# Patient Record
Sex: MALE | Race: WHITE | Employment: FULL TIME | URBAN - METROPOLITAN AREA
[De-identification: names, ages, dates, MRNs, and addresses within clinical notes are randomized per-mention and may not be internally consistent; named-entity substitution may affect disease eponyms.]

---

## 2017-03-22 RX ORDER — METFORMIN HYDROCHLORIDE 1000 MG/1
TABLET ORAL
Qty: 180 TAB | Refills: 1 | Status: SHIPPED | OUTPATIENT
Start: 2017-03-22 | End: 2017-10-28 | Stop reason: SDUPTHER

## 2017-08-31 ENCOUNTER — OFFICE VISIT (OUTPATIENT)
Dept: INTERNAL MEDICINE CLINIC | Age: 52
End: 2017-08-31

## 2017-08-31 VITALS
HEIGHT: 71 IN | OXYGEN SATURATION: 97 % | BODY MASS INDEX: 31.92 KG/M2 | WEIGHT: 228 LBS | RESPIRATION RATE: 18 BRPM | HEART RATE: 51 BPM | TEMPERATURE: 97.9 F | SYSTOLIC BLOOD PRESSURE: 111 MMHG | DIASTOLIC BLOOD PRESSURE: 73 MMHG

## 2017-08-31 DIAGNOSIS — I48.91 ATRIAL FIBRILLATION, UNSPECIFIED TYPE (HCC): Primary | ICD-10-CM

## 2017-08-31 DIAGNOSIS — E11.9 TYPE 2 DIABETES MELLITUS WITHOUT COMPLICATION, WITHOUT LONG-TERM CURRENT USE OF INSULIN (HCC): ICD-10-CM

## 2017-08-31 DIAGNOSIS — M10.9 GOUT, UNSPECIFIED CAUSE, UNSPECIFIED CHRONICITY, UNSPECIFIED SITE: ICD-10-CM

## 2017-08-31 DIAGNOSIS — I10 ESSENTIAL HYPERTENSION: ICD-10-CM

## 2017-08-31 DIAGNOSIS — E78.5 HYPERLIPIDEMIA, UNSPECIFIED HYPERLIPIDEMIA TYPE: ICD-10-CM

## 2017-08-31 RX ORDER — RIVAROXABAN 20 MG/1
TABLET, FILM COATED ORAL
COMMUNITY
Start: 2017-08-24 | End: 2018-10-31 | Stop reason: ALTCHOICE

## 2017-08-31 RX ORDER — METHYLPREDNISOLONE 4 MG/1
TABLET ORAL
COMMUNITY
Start: 2017-08-17 | End: 2017-08-31 | Stop reason: ALTCHOICE

## 2017-08-31 RX ORDER — COLCHICINE 0.6 MG/1
0.6 TABLET ORAL 2 TIMES DAILY
COMMUNITY
End: 2018-02-23 | Stop reason: SDUPTHER

## 2017-08-31 RX ORDER — AMOXICILLIN AND CLAVULANATE POTASSIUM 875; 125 MG/1; MG/1
TABLET, FILM COATED ORAL EVERY 12 HOURS
COMMUNITY
End: 2018-10-31 | Stop reason: ALTCHOICE

## 2017-08-31 RX ORDER — HYDROCHLOROTHIAZIDE 50 MG/1
TABLET ORAL
COMMUNITY
Start: 2017-08-24 | End: 2017-08-31 | Stop reason: ALTCHOICE

## 2017-08-31 RX ORDER — METOPROLOL SUCCINATE 100 MG/1
TABLET, EXTENDED RELEASE ORAL
COMMUNITY
Start: 2017-08-24 | End: 2017-10-26 | Stop reason: SDUPTHER

## 2017-08-31 RX ORDER — DOFETILIDE 0.5 MG/1
CAPSULE ORAL
COMMUNITY
Start: 2017-08-24 | End: 2018-04-26 | Stop reason: ALTCHOICE

## 2017-08-31 NOTE — PROGRESS NOTES
HISTORY OF PRESENT ILLNESS  Sadi Rae is a 54 y.o. male. HPI  Presents for f/u hospital admit    Admitted and dx with Afib following chest pressure at work    Failed to convert s/p meds  Then, s/p cardioversion after MARCO with no clot  On tikosyn    Cards appt next Friday 9/8/17    Had apparent gouty flare  Rx'd medrol dose pack  Stopped when developed Afib  Colchicine Rx'd but not taken yet    rx'd augmentin - unclear reason why    Past medical, Social, and Family history reviewed  Medications reviewed and updated. ROS  Complete ROS reviewed and negative or stable except as noted in HPI. Physical Exam   Constitutional: He is oriented to person, place, and time. He appears well-nourished. No distress. HENT:   Head: Normocephalic and atraumatic. Mouth/Throat: Oropharynx is clear and moist. No oropharyngeal exudate. Eyes: EOM are normal. Pupils are equal, round, and reactive to light. No scleral icterus. Neck: Normal range of motion. Neck supple. No JVD present. No thyromegaly present. Cardiovascular: Normal rate, regular rhythm and normal heart sounds. Exam reveals no gallop and no friction rub. No murmur heard. Pulmonary/Chest: Effort normal and breath sounds normal. No respiratory distress. He has no wheezes. He has no rales. Abdominal: Soft. Bowel sounds are normal. He exhibits no distension. There is no tenderness. Musculoskeletal: Normal range of motion. He exhibits no edema. Lymphadenopathy:     He has no cervical adenopathy. Neurological: He is alert and oriented to person, place, and time. He exhibits normal muscle tone. Coordination normal.   Skin: Skin is warm. No rash noted. Psychiatric: He has a normal mood and affect. Nursing note and vitals reviewed. Prior labs reviewed. Reports uric acid of 8.1    ASSESSMENT and PLAN    ICD-10-CM ICD-9-CM    1. Atrial fibrillation, unspecified type ([facility] I48.91 427.31    2.  Gout, unspecified cause, unspecified chronicity, unspecified site M10.9 274.9    3. Hyperlipidemia, unspecified hyperlipidemia type E78.5 272.4    4. Type 2 diabetes mellitus without complication, without long-term current use of insulin (HCC) E11.9 250.00    5.  Essential hypertension I10 401.9      Follow-up Disposition:  Return in about 2 months (around 10/31/2017), or if symptoms worsen or fail to improve, for cholesterol, diabetes, Afib.  results and schedule of future studies reviewed with patient  reviewed diet, exercise and weight    cardiovascular risk and specific lipid/LDL goals reviewed  reviewed medications and side effects in detail   Get records  Hold on allopurinol or Uloric for now - reviewed each  Dc HCTZ as potential contributor to gout and good BP control today  CT for foot ordered per podiatry  See cards  Continue heart meds and Xarelto

## 2017-08-31 NOTE — LETTER
NOTIFICATION RETURN TO WORK / SCHOOL 
 
8/31/2017 Mr. Berenice Winters 
[address] [place] [address] To Whom It May Concern: 
 
Berenice Winters is currently under the care of Jose Enrique. He will return to work/school on: 9/1/17 If there are questions or concerns please have the patient contact our office. Sincerely, Shadia Strange MD

## 2017-08-31 NOTE — MR AVS SNAPSHOT
Visit Information Date & Time Provider Department Dept. Phone Encounter #  
 8/31/2017 12:00 PM Anahi Vines, [address] [place] and Internal Medicine [phone] [id number] Follow-up Instructions Return in about 2 months (around 10/31/2017), or if symptoms worsen or fail to improve, for cholesterol, diabetes, Afib. Your Appointments 9/13/2017  1:30 PM  
ROUTINE CARE with Anahi Vines MD  
[facility] and Internal Medicine [facility] Appt Note: medication f/u per pt  
 [address] [facility] [address]  
[address] [facility] [address] Upcoming Health Maintenance Date Due MICROALBUMIN Q1 12/31/2015 EYE EXAM RETINAL OR DILATED Q1 5/5/2016 FOOT EXAM Q1 12/30/2016 HEMOGLOBIN A1C Q6M 4/17/2017 INFLUENZA AGE 9 TO ADULT 8/1/2017 LIPID PANEL Q1 10/17/2017 COLONOSCOPY 1/1/2023 DTaP/Tdap/Td series (2 - Td) 12/12/2024 Allergies as of 8/31/2017  Review Complete On: 8/31/2017 By: Anahi Vines MD  
  
 Severity Noted Reaction Type Reactions Fiorinal [Butalbital-aspirin-caffeine]  05/06/2013    Unknown (comments) Throat swelling Current Immunizations  Reviewed on 10/17/2016 Name Date Tdap 12/12/2014 Not reviewed this visit You Were Diagnosed With   
  
 Codes Comments Atrial fibrillation, unspecified type ([facility]    -  Primary ICD-10-CM: I48.91 
ICD-9-CM: 427.31 Gout, unspecified cause, unspecified chronicity, unspecified site     ICD-10-CM: M10.9 ICD-9-CM: 274.9 Hyperlipidemia, unspecified hyperlipidemia type     ICD-10-CM: E78.5 ICD-9-CM: 272.4 Type 2 diabetes mellitus without complication, without long-term current use of insulin (HCC)     ICD-10-CM: E11.9 ICD-9-CM: 250.00 Essential hypertension     ICD-10-CM: I10 
ICD-9-CM: 401.9 Vitals BP Pulse Temp Resp Height(growth percentile) Weight(growth percentile) 111/73 (BP 1 Location: Left arm, BP Patient Position: Sitting) (!) 51 97.9 °F (36.6 °C) (Oral) 18 5' 11\" (1.803 m) 228 lb (103.4 kg) SpO2 BMI Smoking Status 97% 31.8 kg/m2 Former Smoker BMI and BSA Data Body Mass Index Body Surface Area  
 31.8 kg/m 2 2.28 m 2 Preferred Pharmacy Pharmacy Name Phone [facility]/PHARMACY #[id number] [facility] [phone] Your Updated Medication List  
  
   
This list is accurate as of: 8/31/17  1:45 PM.  Always use your most recent med list.  
  
  
  
  
 AUGMENTIN 875-125 mg per tablet Generic drug:  amoxicillin-clavulanate Take  by mouth every twelve (12) hours. colchicine 0.6 mg tablet Take 0.6 mg by mouth two (2) times a day. dofetilide 500 mcg capsule Commonly known as:  TIKOSYN  
  
 lisinopril 40 mg tablet Commonly known as:  PRINIVIL, ZESTRIL  
TAKE 1 TABLET BY MOUTH DAILY  
  
 metFORMIN 1,000 mg tablet Commonly known as:  GLUCOPHAGE  
TAKE 1 TABLET BY MOUTH 2 TIMES DAILY WITH MEALS  
  
 metoprolol succinate 100 mg tablet Commonly known as:  TOPROL-XL  
  
 pravastatin 80 mg tablet Commonly known as:  PRAVACHOL  
TAKE 1 TABLET BY MOUTH NIGHTLY  
  
 triamcinolone acetonide 0.1 % topical cream  
Commonly known as:  KENALOG Apply  to affected area two (2) times a day. use thin layer XARELTO 20 mg Tab tablet Generic drug:  rivaroxaban Follow-up Instructions Return in about 2 months (around 10/31/2017), or if symptoms worsen or fail to improve, for cholesterol, diabetes, Afib. To-Do List   
 09/01/2017 1:00 PM  
  Appointment with [facility] 1 at [facility] CT ([phone]) NON-CONTRAST STUDY: 1. Bring any non [facility] facility films/images pertaining to the area of interest with you on the day of appointment.  2. Check in at registration at least 30 minutes before appt time unless you were instructed to do otherwise. 3. If you have to drink oral contrast please pick it up any weekday prior to your appointment, if you cannot please check in 2 hrs  before appt time. Patient Instructions Uloric vs Allopurinol for gout Introducing [facility] SERVICES! Willadean Saint introduces [employer] patient portal. Now you can access parts of your medical record, email your doctor's office, and request medication refills online. 1. In your internet browser, go to [email or web address] 2. Click on the First Time User? Click Here link in the Sign In box. You will see the New Member Sign Up page. 3. Enter your [employer] Access Code exactly as it appears below. You will not need to use this code after youve completed the sign-up process. If you do not sign up before the expiration date, you must request a new code. · [employer] Access Code: [email or web address] Expires: 11/16/2017  1:26 PM 
 
4. Enter the last four digits of your Social Security Number (xxxx) and Date of Birth (mm/dd/yyyy) as indicated and click Submit. You will be taken to the next sign-up page. 5. Create a [employer]ie[employer] ID. This will be your [employer] login ID and cannot be changed, so think of one that is secure and easy to remember. 6. Create a [employer] password. You can change your password at any time. 7. Enter your Password Reset Question and Answer. This can be used at a later time if you forget your password. 8. Enter your e-mail address. You will receive e-mail notification when new information is available in [address] 9. Click Sign Up. You can now view and download portions of your medical record. 10. Click the Download Summary menu link to download a portable copy of your medical information. If you have questions, please visit the Frequently Asked Questions section of the [employer] website.  Remember, [employer] is NOT to be used for urgent needs. For medical emergencies, dial 911. Now available from your iPhone and Android! Please provide this summary of care documentation to your next provider. Your primary care clinician is listed as [id number] If you have any questions after today's visit, please call [phone].

## 2017-08-31 NOTE — PROGRESS NOTES
Rm 14    Chief Complaint   Patient presents with   [facility] Follow Up     IRENE NOWAK from [facility]     Patient was Admitted 8/21/2017 to [facility]   with Afib with RVR   ,  Patient was in for 4 days d/c 8/24      Patient states he went to a podiatry appt prior ro felix to the Er Podiatrist put him on Methylprednisolone 4 mg , Pt is wondering if this could have caused his a fib  Health Maintenance Due   Topic Date Due    MICROALBUMIN Q1  12/31/2015    EYE EXAM RETINAL OR DILATED Q1  05/05/2016    FOOT EXAM Q1  12/30/2016    HEMOGLOBIN A1C Q6M  04/17/2017    INFLUENZA AGE 9 TO ADULT  08/01/2017           1. Have you been to the ER, urgent care clinic since your last visit? Hospitalized since your last visit? yes/ [facility] ER/ 8/21- 8/24/ Afin with RVR    2. Have you seen or consulted any other health care providers outside of the [address] [place] [address] since your last visit? Include any pap smears or colon screening.  Yes see above medical release form completed to be sent for record    Learning Assessment 6/12/2015   PRIMARY LEARNER Patient   BARRIERS PRIMARY LEARNER NONE   PRIMARY LANGUAGE ENGLISH   LEARNER PREFERENCE PRIMARY DEMONSTRATION   ANSWERED BY [employer]   RELATIONSHIP SELF     PHQ over the last two weeks 12/30/2015   Little interest or pleasure in doing things Not at all   Feeling down, depressed or hopeless Not at all   Total Score PHQ 2 0     Living medical will information given at previous visit

## 2017-09-01 ENCOUNTER — HOSPITAL ENCOUNTER (OUTPATIENT)
Dept: CT IMAGING | Age: 52
Discharge: HOME | End: 2017-09-01
Attending: PODIATRIST
Payer: COMMERCIAL

## 2017-09-01 DIAGNOSIS — M79.671 RIGHT FOOT PAIN: ICD-10-CM

## 2017-09-01 PROCEDURE — 73700 CT LOWER EXTREMITY W/O DYE: CPT

## 2017-09-05 RX ORDER — PRAVASTATIN SODIUM 80 MG/1
TABLET ORAL
Qty: 90 TAB | Refills: 1 | Status: SHIPPED | OUTPATIENT
Start: 2017-09-05 | End: 2017-11-30 | Stop reason: ALTCHOICE

## 2017-10-07 RX ORDER — LISINOPRIL 40 MG/1
TABLET ORAL
Qty: 90 TAB | Refills: 1 | Status: SHIPPED | OUTPATIENT
Start: 2017-10-07 | End: 2018-04-05 | Stop reason: SDUPTHER

## 2017-10-22 RX ORDER — HYDROCHLOROTHIAZIDE 25 MG/1
TABLET ORAL
Qty: 90 TAB | Refills: 0 | OUTPATIENT
Start: 2017-10-22

## 2017-10-26 ENCOUNTER — OFFICE VISIT (OUTPATIENT)
Dept: INTERNAL MEDICINE CLINIC | Age: 52
End: 2017-10-26

## 2017-10-26 VITALS
DIASTOLIC BLOOD PRESSURE: 93 MMHG | BODY MASS INDEX: 34.61 KG/M2 | RESPIRATION RATE: 16 BRPM | SYSTOLIC BLOOD PRESSURE: 176 MMHG | HEART RATE: 47 BPM | OXYGEN SATURATION: 97 % | HEIGHT: 71 IN | TEMPERATURE: 97.9 F | WEIGHT: 247.2 LBS

## 2017-10-26 DIAGNOSIS — M10.9 GOUT, UNSPECIFIED CAUSE, UNSPECIFIED CHRONICITY, UNSPECIFIED SITE: ICD-10-CM

## 2017-10-26 DIAGNOSIS — I10 ESSENTIAL HYPERTENSION: Primary | ICD-10-CM

## 2017-10-26 DIAGNOSIS — E11.9 TYPE 2 DIABETES MELLITUS WITHOUT COMPLICATION, WITHOUT LONG-TERM CURRENT USE OF INSULIN (HCC): ICD-10-CM

## 2017-10-26 DIAGNOSIS — E78.5 HYPERLIPIDEMIA, UNSPECIFIED HYPERLIPIDEMIA TYPE: ICD-10-CM

## 2017-10-26 DIAGNOSIS — R63.5 WEIGHT GAIN: ICD-10-CM

## 2017-10-26 DIAGNOSIS — N50.811 PAIN IN RIGHT TESTICLE: ICD-10-CM

## 2017-10-26 LAB
ALBUMIN UR QL STRIP: 30 MG/L
CREATININE, URINE POC: 50 MG/DL
HBA1C MFR BLD HPLC: 5.3 % (ref 4.8–5.6)
MICROALBUMIN/CREAT RATIO POC: NORMAL MG/G

## 2017-10-26 RX ORDER — METOPROLOL SUCCINATE 50 MG/1
50 TABLET, EXTENDED RELEASE ORAL DAILY
Qty: 30 TAB | Refills: 0
Start: 2017-10-26 | End: 2018-04-26 | Stop reason: SDUPTHER

## 2017-10-26 RX ORDER — AMLODIPINE BESYLATE 5 MG/1
5 TABLET ORAL DAILY
Qty: 30 TAB | Refills: 5 | Status: SHIPPED | OUTPATIENT
Start: 2017-10-26 | End: 2017-11-29 | Stop reason: SDUPTHER

## 2017-10-26 NOTE — PROGRESS NOTES
Rm 15    Chief Complaint   Patient presents with    Follow-up     cholesterol, diabetes, Afib     1. Have you been to the ER, urgent care clinic since your last visit? Hospitalized since your last visit? No    2. Have you seen or consulted any other health care providers outside of the [address] [place] [address] since your last visit? Include any pap smears or colon screening.  No    Health Maintenance Due   Topic Date Due    MICROALBUMIN Q1  12/31/2015    EYE EXAM RETINAL OR DILATED Q1  05/05/2016    FOOT EXAM Q1  12/30/2016    HEMOGLOBIN A1C Q6M  04/17/2017    INFLUENZA AGE 9 TO ADULT  08/01/2017    LIPID PANEL Q1  10/17/2017    No recent eye exam per pt  Pt declines the flu shot today    PHQ over the last two weeks 10/26/2017   Little interest or pleasure in doing things Not at all   Feeling down, depressed or hopeless Not at all   Total Score PHQ 2 0

## 2017-10-26 NOTE — PROGRESS NOTES
HISTORY OF PRESENT ILLNESS  Chelo Campuzano is a 54 y.o. male. HPI  Presents for f/u HTN, lipids, afib    Pt has seen Cards and EP  Planning for ablation    +wt gain  No edema or orthopnea    Pt had argument with employer right before coming over to the visit    [facility] and struck testicle and quite painful    Past medical, Social, and Family history reviewed  Medications reviewed and updated. ROS  Complete ROS reviewed and negative or stable except as noted in HPI. Physical Exam   Constitutional: He is oriented to person, place, and time. He appears well-nourished. No distress. HENT:   Head: Normocephalic and atraumatic. Mouth/Throat: Oropharynx is clear and moist. No oropharyngeal exudate. Eyes: EOM are normal. Pupils are equal, round, and reactive to light. No scleral icterus. Neck: Normal range of motion. Neck supple. No JVD present. No thyromegaly present. Cardiovascular: Normal rate, regular rhythm and normal heart sounds. Exam reveals no gallop and no friction rub. No murmur heard. Pulmonary/Chest: Effort normal and breath sounds normal. No respiratory distress. He has no wheezes. He has no rales. Abdominal: Soft. Bowel sounds are normal. He exhibits no distension. There is no tenderness. Genitourinary:   Genitourinary Comments: Right testicle mildly tender, NL size, no nodule or mass, seated high in the scrotum though. Musculoskeletal: Normal range of motion. He exhibits no edema. Lymphadenopathy:     He has no cervical adenopathy. Neurological: He is alert and oriented to person, place, and time. He exhibits normal muscle tone. Coordination normal.   Skin: Skin is warm. No rash noted. Psychiatric: He has a normal mood and affect. Nursing note and vitals reviewed. Prior labs reviewed. ASSESSMENT and PLAN  ? reactive HTN today     ICD-10-CM ICD-9-CM    1.  Essential hypertension I10 401.9 CBC WITH AUTOMATED DIFF      amLODIPine (NORVASC) 5 mg tablet 2. Type 2 diabetes mellitus without complication, without long-term current use of insulin (HCC) E11.9 250.00 AMB POC HEMOGLOBIN A1C      AMB POC URINE, MICROALBUMIN, SEMIQUANT (3 RESULTS)   3. Hyperlipidemia, unspecified hyperlipidemia type U31.9 819.4 METABOLIC PANEL, COMPREHENSIVE      LIPID PANEL   4. Pain in right testicle N50.811 608.9 US SCROTUM/TESTICLES   5. Weight gain R63.5 783.1 T4, FREE      TSH 3RD GENERATION   6. Gout, unspecified cause, unspecified chronicity, unspecified site M10.9 274.9 URIC ACID     Follow-up Disposition:  Return in about 1 month (around 11/26/2017), or if symptoms worsen or fail to improve, for blood pressure.    results and schedule of future studies reviewed with patient  reviewed diet, exercise and weight    cardiovascular risk and specific lipid/LDL goals reviewed  reviewed medications and side effects in detail   Check labs to include thyroid, lipids - return fasting  Consider norvasc if BP remains elevated - Rx eRx'd  Ablation 11/6/17 as scheduled  Testicle, scrotal US

## 2017-10-26 NOTE — MR AVS SNAPSHOT
Visit Information Date & Time Provider Department Dept. Phone Encounter #  
 10/26/2017 11:45 AM Mateusz Burton, [address] [place] [address] and Internal Medicine [phone] [id number] Follow-up Instructions Return in about 1 month (around 11/26/2017), or if symptoms worsen or fail to improve, for blood pressure. Upcoming Health Maintenance Date Due MICROALBUMIN Q1 12/31/2015 EYE EXAM RETINAL OR DILATED Q1 5/5/2016 FOOT EXAM Q1 12/30/2016 HEMOGLOBIN A1C Q6M 4/17/2017 LIPID PANEL Q1 10/17/2017 COLONOSCOPY 1/1/2023 DTaP/Tdap/Td series (2 - Td) 12/12/2024 Allergies as of 10/26/2017  Review Complete On: 10/26/2017 By: Mateusz Burton MD  
  
 Severity Noted Reaction Type Reactions Fiorinal [Butalbital-aspirin-caffeine]  05/06/2013    Unknown (comments) Throat swelling Current Immunizations  Reviewed on 10/17/2016 Name Date Tdap 12/12/2014 Not reviewed this visit You Were Diagnosed With   
  
 Codes Comments Essential hypertension    -  Primary ICD-10-CM: I10 
ICD-9-CM: 401.9 Type 2 diabetes mellitus without complication, without long-term current use of insulin (HCC)     ICD-10-CM: E11.9 ICD-9-CM: 250.00 Hyperlipidemia, unspecified hyperlipidemia type     ICD-10-CM: E78.5 ICD-9-CM: 272.4 Pain in right testicle     ICD-10-CM: N50.811 ICD-9-CM: 608.9 Weight gain     ICD-10-CM: R63.5 ICD-9-CM: 783.1 Gout, unspecified cause, unspecified chronicity, unspecified site     ICD-10-CM: M10.9 ICD-9-CM: 274.9 Vitals BP Pulse Temp Resp Height(growth percentile) Weight(growth percentile) (!) 176/93 (BP 1 Location: Left arm, BP Patient Position: Sitting) (!) 47 97.9 °F (36.6 °C) (Oral) 16 5' 11\" (1.803 m) 247 lb 3.2 oz (112.1 kg) SpO2 BMI Smoking Status 97% 34.48 kg/m2 Former Smoker Vitals History BMI and BSA Data Body Mass Index Body Surface Area  34.48 kg/m 2 2.37 m 2  
  
  
 Preferred Pharmacy Pharmacy Name Phone [facility]/PHARMACY #[id number] Lindsey Solano [phone] Your Updated Medication List  
  
   
This list is accurate as of: 10/26/17  1:08 PM.  Always use your most recent med list. amLODIPine 5 mg tablet Commonly known as:  [place] Ki Take 1 Tab by mouth daily. AUGMENTIN 875-125 mg per tablet Generic drug:  amoxicillin-clavulanate Take  by mouth every twelve (12) hours. colchicine 0.6 mg tablet Take 0.6 mg by mouth two (2) times a day. dofetilide 500 mcg capsule Commonly known as:  TIKOSYN  
  
 lisinopril 40 mg tablet Commonly known as:  PRINIVIL, ZESTRIL  
TAKE 1 TABLET BY MOUTH DAILY  
  
 metFORMIN 1,000 mg tablet Commonly known as:  GLUCOPHAGE  
TAKE 1 TABLET BY MOUTH 2 TIMES DAILY WITH MEALS  
  
 metoprolol succinate 50 mg XL tablet Commonly known as:  TOPROL-XL Take 1 Tab by mouth daily. Indications: should be 50 mg per pt  
  
 pravastatin 80 mg tablet Commonly known as:  PRAVACHOL  
TAKE 1 TABLET BY MOUTH NIGHTLY  
  
 triamcinolone acetonide 0.1 % topical cream  
Commonly known as:  KENALOG Apply  to affected area two (2) times a day. use thin layer XARELTO 20 mg Tab tablet Generic drug:  rivaroxaban  
  
  
  
  
Prescriptions Sent to Pharmacy Refills  
 amLODIPine (NORVASC) 5 mg tablet 5 Sig: Take 1 Tab by mouth daily. Class: Normal  
 Pharmacy: [facility]/pharmacy #[id number]  [address] Ph #: [phone] Route: Oral  
  
We Performed the Following AMB POC HEMOGLOBIN A1C [46342 CPT(R)] AMB POC URINE, MICROALBUMIN, SEMIQUANT (3 RESULTS) [45979 CPT(R)] CBC WITH AUTOMATED DIFF [61986 CPT(R)] LIPID PANEL [14615 CPT(R)] METABOLIC PANEL, COMPREHENSIVE [86031 CPT(R)] T4, FREE Q6012224 CPT(R)] TSH 3RD GENERATION [57883 CPT(R)] URIC ACID [id number] CPT(R)] Follow-up Instructions Return in about 1 month (around 11/26/2017), or if symptoms worsen or fail to improve, for blood pressure. To-Do List   
 11/02/2017 Imaging:  US SCROTUM/TESTICLES Introducing [facility] & HEALTH SERVICES! Akin Ross introduces [employer] patient portal. Now you can access parts of your medical record, email your doctor's office, and request medication refills online. 1. In your internet browser, go to [email or web address] 2. Click on the First Time User? Click Here link in the Sign In box. You will see the New Member Sign Up page. 3. Enter your Wal[employer]i Access Code exactly as it appears below. You will not need to use this code after youve completed the sign-up process. If you do not sign up before the expiration date, you must request a new code. · [employer] Access Code: [email or web address] Expires: 11/16/2017  1:26 PM 
 
4. Enter the last four digits of your Social Security Number (xxxx) and Date of Birth (mm/dd/yyyy) as indicated and click Submit. You will be taken to the next sign-up page. 5. Create a Waluzi ID. This will be your [employer]i login ID and cannot be changed, so think of one that is secure and easy to remember. 6. Create a Waluzi password. You can change your password at any time. 7. Enter your Password Reset Question and Answer. This can be used at a later time if you forget your password. 8. Enter your e-mail address. You will receive e-mail notification when new information is available in [address] 9. Click Sign Up. You can now view and download portions of your medical record. 10. Click the Download Summary menu link to download a portable copy of your medical information. If you have questions, please visit the Frequently Asked Questions section of the [employer] website. Remember, [employer]i is NOT to be used for urgent needs. For medical emergencies, dial 911. Now available from your iPhone and Android! Please provide this summary of care documentation to your next provider. Your primary care clinician is listed as [address] If you have any questions after today's visit, please call [phone].

## 2017-10-30 RX ORDER — METFORMIN HYDROCHLORIDE 1000 MG/1
TABLET ORAL
Qty: 180 TAB | Refills: 1 | Status: SHIPPED | OUTPATIENT
Start: 2017-10-30 | End: 2018-05-05 | Stop reason: SDUPTHER

## 2017-11-03 ENCOUNTER — HOSPITAL ENCOUNTER (OUTPATIENT)
Dept: ULTRASOUND IMAGING | Age: 52
Discharge: HOME | End: 2017-11-03
Attending: INTERNAL MEDICINE
Payer: COMMERCIAL

## 2017-11-03 DIAGNOSIS — N50.811 PAIN IN RIGHT TESTICLE: ICD-10-CM

## 2017-11-03 PROCEDURE — 76870 US EXAM SCROTUM: CPT

## 2017-11-29 ENCOUNTER — OFFICE VISIT (OUTPATIENT)
Dept: INTERNAL MEDICINE CLINIC | Age: 52
End: 2017-11-29

## 2017-11-29 VITALS
RESPIRATION RATE: 16 BRPM | SYSTOLIC BLOOD PRESSURE: 145 MMHG | HEIGHT: 71 IN | TEMPERATURE: 98.1 F | HEART RATE: 48 BPM | BODY MASS INDEX: 34.55 KG/M2 | DIASTOLIC BLOOD PRESSURE: 84 MMHG | WEIGHT: 246.8 LBS | OXYGEN SATURATION: 97 %

## 2017-11-29 DIAGNOSIS — E78.5 HYPERLIPIDEMIA, UNSPECIFIED HYPERLIPIDEMIA TYPE: ICD-10-CM

## 2017-11-29 DIAGNOSIS — R21 RASH: ICD-10-CM

## 2017-11-29 DIAGNOSIS — I10 ESSENTIAL HYPERTENSION: Primary | ICD-10-CM

## 2017-11-29 DIAGNOSIS — E11.9 TYPE 2 DIABETES MELLITUS WITHOUT COMPLICATION, WITHOUT LONG-TERM CURRENT USE OF INSULIN (HCC): ICD-10-CM

## 2017-11-29 DIAGNOSIS — M10.9 GOUT, UNSPECIFIED CAUSE, UNSPECIFIED CHRONICITY, UNSPECIFIED SITE: ICD-10-CM

## 2017-11-29 RX ORDER — PANTOPRAZOLE SODIUM 40 MG/1
TABLET, DELAYED RELEASE ORAL
COMMUNITY
Start: 2017-11-06 | End: 2018-10-31 | Stop reason: ALTCHOICE

## 2017-11-29 RX ORDER — AMLODIPINE BESYLATE 10 MG/1
10 TABLET ORAL DAILY
Qty: 90 TAB | Refills: 1 | Status: SHIPPED | OUTPATIENT
Start: 2017-11-29 | End: 2018-02-23 | Stop reason: SDUPTHER

## 2017-11-29 RX ORDER — FEBUXOSTAT 80 MG/1
80 TABLET, FILM COATED ORAL DAILY
Qty: 90 TAB | Refills: 1 | Status: SHIPPED | OUTPATIENT
Start: 2017-11-29 | End: 2018-02-23

## 2017-11-29 NOTE — PROGRESS NOTES
HISTORY OF PRESENT ILLNESS  Irving Chambers is a 54 y.o. male. HPI  Presents for f/u HTN, etc    BP had remained high so started norvasc  Tolerating well. S/p ablation, now in sinus rhythm  Cards f/u last week - doing well re: heart    Testicular symptoms resolved. Pt requests ref to Derm again - scalp rash has recurred some    Past medical, Social, and Family history reviewed  Medications reviewed and updated. ROS  Complete ROS reviewed and negative or stable except as noted in HPI. Physical Exam   Constitutional: He is oriented to person, place, and time. He appears well-nourished. No distress. HENT:   Head: Normocephalic and atraumatic. Mouth/Throat: Oropharynx is clear and moist. No oropharyngeal exudate. Eyes: EOM are normal. Pupils are equal, round, and reactive to light. No scleral icterus. Neck: Normal range of motion. Neck supple. No JVD present. No thyromegaly present. Cardiovascular: Normal rate, regular rhythm and normal heart sounds. Exam reveals no gallop and no friction rub. No murmur heard. Pulmonary/Chest: Effort normal and breath sounds normal. No respiratory distress. He has no wheezes. He has no rales. Abdominal: Soft. Bowel sounds are normal. He exhibits no distension. There is no tenderness. Musculoskeletal: Normal range of motion. He exhibits no edema. Lymphadenopathy:     He has no cervical adenopathy. Neurological: He is alert and oriented to person, place, and time. He exhibits normal muscle tone. Coordination normal.   Skin: Skin is warm. No rash noted. Psychiatric: He has a normal mood and affect. Nursing note and vitals reviewed. Prior labs reviewed. ASSESSMENT and PLAN    ICD-10-CM ICD-9-CM    1. Essential hypertension I10 401.9 CBC WITH AUTOMATED DIFF      T4, FREE      TSH 3RD GENERATION      amLODIPine (NORVASC) 10 mg tablet   2.  Gout, unspecified cause, unspecified chronicity, unspecified site M10.9 274.9 febuxostat (ULORIC) 80 mg tab tablet      URIC ACID   3. Type 2 diabetes mellitus without complication, without long-term current use of insulin (HCC) E11.9 250.00 REFERRAL TO OPHTHALMOLOGY   4. Hyperlipidemia, unspecified hyperlipidemia type E78.5 272.4 CK      LIPID PANEL      METABOLIC PANEL, COMPREHENSIVE   5. Rash R21 782.1 REFERRAL TO DERMATOLOGY     Follow-up Disposition:  Return in about 3 weeks (around 12/20/2017), or if symptoms worsen or fail to improve, for blood pressure.   results and schedule of future studies reviewed with patient  reviewed diet, exercise and weight   cardiovascular risk and specific lipid/LDL goals reviewed  reviewed medications and side effects in detail   Increase norvasc to 10 mg  Fasting labs today  See cards as scheduled  Ref to eye  Ref to derm

## 2017-11-29 NOTE — MR AVS SNAPSHOT
Visit Information Date & Time Provider Department Dept. Phone Encounter #  
 11/29/2017  9:30 AM Jennifer Hickey MD [facility] Pediatrics and Internal Medicine [phone] [id number] Follow-up Instructions Return in about 3 weeks (around 12/20/2017), or if symptoms worsen or fail to improve, for blood pressure. Upcoming Health Maintenance Date Due  
 EYE EXAM RETINAL OR DILATED Q1 5/5/2016 FOOT EXAM Q1 12/30/2016 LIPID PANEL Q1 10/17/2017 HEMOGLOBIN A1C Q6M 4/26/2018 MICROALBUMIN Q1 10/26/2018 COLONOSCOPY 1/1/2023 DTaP/Tdap/Td series (2 - Td) 12/12/2024 Allergies as of 11/29/2017  Review Complete On: 11/29/2017 By: Jennifer Hickey MD  
  
 Severity Noted Reaction Type Reactions Fiorinal [Butalbital-aspirin-caffeine]  05/06/2013    Unknown (comments) Throat swelling Current Immunizations  Reviewed on 10/17/2016 Name Date Tdap 12/12/2014 Not reviewed this visit You Were Diagnosed With   
  
 Codes Comments Essential hypertension    -  Primary ICD-10-CM: I10 
ICD-9-CM: 401.9 Gout, unspecified cause, unspecified chronicity, unspecified site     ICD-10-CM: M10.9 ICD-9-CM: 274.9 Type 2 diabetes mellitus without complication, without long-term current use of insulin (HCC)     ICD-10-CM: E11.9 ICD-9-CM: 250.00 Hyperlipidemia, unspecified hyperlipidemia type     ICD-10-CM: E78.5 ICD-9-CM: 272.4 Rash     ICD-10-CM: R21 
ICD-9-CM: 782.1 Vitals BP Pulse Temp Resp Height(growth percentile) Weight(growth percentile) 145/84 (BP 1 Location: Left arm, BP Patient Position: Sitting) (!) 48 98.1 °F (36.7 °C) (Oral) 16 5' 11\" (1.803 m) 246 lb 12.8 oz (111.9 kg) SpO2 BMI Smoking Status 97% 34.42 kg/m2 Former Smoker BMI and BSA Data Body Mass Index Body Surface Area 34.42 kg/m 2 2.37 m 2 Preferred Pharmacy Pharmacy Name Phone [facility]Y #[id number] Lindsey Whites [phone] Your Updated Medication List  
  
   
This list is accurate as of: 11/29/17 10:15 AM.  Always use your most recent med list. amLODIPine 10 mg tablet Commonly known as:  Tyson Mary Take 1 Tab by mouth daily. AUGMENTIN 875-125 mg per tablet Generic drug:  amoxicillin-clavulanate Take  by mouth every twelve (12) hours. colchicine 0.6 mg tablet Take 0.6 mg by mouth two (2) times a day. dofetilide 500 mcg capsule Commonly known as:  TIKOSYN  
  
 febuxostat 80 mg Tab tablet Commonly known as:  Casar Blade Take 1 Tab by mouth daily. lisinopril 40 mg tablet Commonly known as:  PRINIVIL, ZESTRIL  
TAKE 1 TABLET BY MOUTH DAILY  
  
 metFORMIN 1,000 mg tablet Commonly known as:  GLUCOPHAGE  
TAKE 1 TABLET BY MOUTH 2 TIMES DAILY WITH MEALS  
  
 metoprolol succinate 50 mg XL tablet Commonly known as:  TOPROL-XL Take 1 Tab by mouth daily. Indications: should be 50 mg per pt  
  
 pantoprazole 40 mg tablet Commonly known as:  PROTONIX  
  
 pravastatin 80 mg tablet Commonly known as:  PRAVACHOL  
TAKE 1 TABLET BY MOUTH NIGHTLY  
  
 triamcinolone acetonide 0.1 % topical cream  
Commonly known as:  KENALOG Apply  to affected area two (2) times a day. use thin layer XARELTO 20 mg Tab tablet Generic drug:  rivaroxaban  
  
  
  
  
Prescriptions Sent to Pharmacy Refills  
 febuxostat (ULORIC) 80 mg tab tablet 1 Sig: Take 1 Tab by mouth daily. Class: Normal  
 Pharmacy: [facility]/pharmacy #[id number] - [place], [address] [place] [address] Ph #: [phone] Route: Oral  
 amLODIPine (NORVASC) 10 mg tablet 1 Sig: Take 1 Tab by mouth daily. Class: Normal  
 Pharmacy: [facility]/pharmacy #[id number] - [place] [facility], [address] [facility] Ph #: [phone] Route: Oral  
  
We Performed the Following CBC WITH AUTOMATED DIFF [79974 CPT(R)] CK [id number] CPT(R)] LIPID PANEL [85963 CPT(R)] METABOLIC PANEL, COMPREHENSIVE [38058 CPT(R)] REFERRAL TO DERMATOLOGY [REF19 Custom] REFERRAL TO OPHTHALMOLOGY [REF57 Custom] T4, FREE X616234 CPT(R)] TSH 3RD GENERATION [94144 CPT(R)] URIC ACID [id number] CPT(R)] Follow-up Instructions Return in about 3 weeks (around 12/20/2017), or if symptoms worsen or fail to improve, for blood pressure. Referral Information Referral ID Referred By Referred To  
  
 [id number] Brit Miranda DO   
   [address] [facility] Phone: [phone] Fax: [phone] Visits Status Start Date End Date 1 New Request 11/29/17 11/29/18 If your referral has a status of pending review or denied, additional information will be sent to support the outcome of this decision. Referral ID Referred By Referred To  
 [id number] Rosemarie Papas [facility] [address] [facility], [facility] Visits Status Start Date End Date 1 New Request 11/29/17 11/29/18 If your referral has a status of pending review or denied, additional information will be sent to support the outcome of this decision. Introducing [facility] & HEALTH SERVICES! [employer] introduces [employer] patient portal. Now you can access parts of your medical record, email your doctor's office, and request medication refills online. 1. In your internet browser, go to [email or web address] 2. Click on the First Time User? Click Here link in the Sign In box. You will see the New Member Sign Up page. 3. Enter your [employer] Access Code exactly as it appears below. You will not need to use this code after youve completed the sign-up process. If you do not sign up before the expiration date, you must request a new code. · [employer] Access Code: [email or web address] Expires: 2/27/2018 10:14 AM 
 
 4. Enter the last four digits of your Social Security Number (xxxx) and Date of Birth (mm/dd/yyyy) as indicated and click Submit. You will be taken to the next sign-up page. 5. Create a Rootdown ID. This will be your [employer] login ID and cannot be changed, so think of one that is secure and easy to remember. 6. Create a Rootdown password. You can change your password at any time. 7. Enter your Password Reset Question and Answer. This can be used at a later time if you forget your password. 8. Enter your e-mail address. You will receive e-mail notification when new information is available in [address] 9. Click Sign Up. You can now view and download portions of your medical record. 10. Click the Download Summary menu link to download a portable copy of your medical information. If you have questions, please visit the Frequently Asked Questions section of the [employer] website. Remember, [employer] is NOT to be used for urgent needs. For medical emergencies, dial 911. Now available from your iPhone and Android! Please provide this summary of care documentation to your next provider. Your primary care clinician is listed as [address] If you have any questions after today's visit, please call [phone].

## 2017-11-29 NOTE — PROGRESS NOTES
Rm 13    Chief Complaint   Patient presents with    Blood Pressure Check     1. Have you been to the ER, urgent care clinic since your last visit? Hospitalized since your last visit? ED visit, [facility] [place], month ago    2. Have you seen or consulted any other health care providers outside of the [address] [place] [address] since your last visit? Include any pap smears or colon screening.  No    Health Maintenance Due   Topic Date Due    EYE EXAM RETINAL OR DILATED Q1  05/05/2016    FOOT EXAM Q1  12/30/2016    LIPID PANEL Q1  10/17/2017     PHQ over the last two weeks 10/26/2017   Little interest or pleasure in doing things Not at all   Feeling down, depressed or hopeless Not at all   Total Score PHQ 2 0

## 2017-11-30 DIAGNOSIS — E78.5 HYPERLIPIDEMIA, UNSPECIFIED HYPERLIPIDEMIA TYPE: Primary | ICD-10-CM

## 2017-11-30 DIAGNOSIS — E11.9 TYPE 2 DIABETES MELLITUS WITHOUT COMPLICATION, WITHOUT LONG-TERM CURRENT USE OF INSULIN (HCC): ICD-10-CM

## 2017-11-30 DIAGNOSIS — I10 ESSENTIAL HYPERTENSION: ICD-10-CM

## 2017-11-30 DIAGNOSIS — M10.9 GOUT, UNSPECIFIED CAUSE, UNSPECIFIED CHRONICITY, UNSPECIFIED SITE: ICD-10-CM

## 2017-11-30 LAB
ALBUMIN SERPL-MCNC: 4.8 G/DL (ref 3.5–5.5)
ALBUMIN/GLOB SERPL: 1.6 {RATIO} (ref 1.2–2.2)
ALP SERPL-CCNC: 51 IU/L (ref 39–117)
ALT SERPL-CCNC: 49 IU/L (ref 0–44)
AST SERPL-CCNC: 36 IU/L (ref 0–40)
BASOPHILS # BLD AUTO: 0 X10E3/UL (ref 0–0.2)
BASOPHILS NFR BLD AUTO: 1 %
BILIRUB SERPL-MCNC: 0.6 MG/DL (ref 0–1.2)
BUN SERPL-MCNC: 11 MG/DL (ref 6–24)
BUN/CREAT SERPL: 15 (ref 9–20)
CALCIUM SERPL-MCNC: 10.1 MG/DL (ref 8.7–10.2)
CHLORIDE SERPL-SCNC: 97 MMOL/L (ref 96–106)
CHOLEST SERPL-MCNC: 191 MG/DL (ref 100–199)
CK SERPL-CCNC: 67 U/L (ref 24–204)
CO2 SERPL-SCNC: 26 MMOL/L (ref 18–29)
CREAT SERPL-MCNC: 0.75 MG/DL (ref 0.76–1.27)
EOSINOPHIL # BLD AUTO: 0.1 X10E3/UL (ref 0–0.4)
EOSINOPHIL NFR BLD AUTO: 1 %
ERYTHROCYTE [DISTWIDTH] IN BLOOD BY AUTOMATED COUNT: 13.3 % (ref 12.3–15.4)
GLOBULIN SER CALC-MCNC: 3 G/DL (ref 1.5–4.5)
GLUCOSE SERPL-MCNC: 114 MG/DL (ref 65–99)
HCT VFR BLD AUTO: 42.3 % (ref 37.5–51)
HDLC SERPL-MCNC: 65 MG/DL
HGB BLD-MCNC: 14.4 G/DL (ref 12.6–17.7)
IMM GRANULOCYTES # BLD: 0 X10E3/UL (ref 0–0.1)
IMM GRANULOCYTES NFR BLD: 0 %
LDLC SERPL CALC-MCNC: 109 MG/DL (ref 0–99)
LYMPHOCYTES # BLD AUTO: 2 X10E3/UL (ref 0.7–3.1)
LYMPHOCYTES NFR BLD AUTO: 29 %
MCH RBC QN AUTO: 33 PG (ref 26.6–33)
MCHC RBC AUTO-ENTMCNC: 34 G/DL (ref 31.5–35.7)
MCV RBC AUTO: 97 FL (ref 79–97)
MONOCYTES # BLD AUTO: 0.5 X10E3/UL (ref 0.1–0.9)
MONOCYTES NFR BLD AUTO: 7 %
NEUTROPHILS # BLD AUTO: 4.3 X10E3/UL (ref 1.4–7)
NEUTROPHILS NFR BLD AUTO: 62 %
PLATELET # BLD AUTO: 250 X10E3/UL (ref 150–379)
POTASSIUM SERPL-SCNC: 4.9 MMOL/L (ref 3.5–5.2)
PROT SERPL-MCNC: 7.8 G/DL (ref 6–8.5)
RBC # BLD AUTO: 4.36 X10E6/UL (ref 4.14–5.8)
SODIUM SERPL-SCNC: 139 MMOL/L (ref 134–144)
T4 FREE SERPL-MCNC: 1.34 NG/DL (ref 0.82–1.77)
TRIGL SERPL-MCNC: 87 MG/DL (ref 0–149)
TSH SERPL DL<=0.005 MIU/L-ACNC: 0.97 UIU/ML (ref 0.45–4.5)
URATE SERPL-MCNC: 7.5 MG/DL (ref 3.7–8.6)
VLDLC SERPL CALC-MCNC: 17 MG/DL (ref 5–40)
WBC # BLD AUTO: 6.9 X10E3/UL (ref 3.4–10.8)

## 2017-11-30 RX ORDER — ROSUVASTATIN CALCIUM 20 MG/1
20 TABLET, COATED ORAL
Qty: 90 TAB | Refills: 1 | Status: SHIPPED | OUTPATIENT
Start: 2017-11-30 | End: 2018-09-10 | Stop reason: SDUPTHER

## 2017-12-22 ENCOUNTER — OFFICE VISIT (OUTPATIENT)
Dept: INTERNAL MEDICINE CLINIC | Age: 52
End: 2017-12-22

## 2017-12-22 VITALS
SYSTOLIC BLOOD PRESSURE: 150 MMHG | DIASTOLIC BLOOD PRESSURE: 100 MMHG | TEMPERATURE: 97.7 F | HEART RATE: 47 BPM | HEIGHT: 71 IN | OXYGEN SATURATION: 97 % | RESPIRATION RATE: 18 BRPM | WEIGHT: 248 LBS | BODY MASS INDEX: 34.72 KG/M2

## 2017-12-22 DIAGNOSIS — Z86.79 S/P ABLATION OF ATRIAL FIBRILLATION: ICD-10-CM

## 2017-12-22 DIAGNOSIS — E11.9 TYPE 2 DIABETES MELLITUS WITHOUT COMPLICATION, WITHOUT LONG-TERM CURRENT USE OF INSULIN (HCC): ICD-10-CM

## 2017-12-22 DIAGNOSIS — Z98.890 S/P ABLATION OF ATRIAL FIBRILLATION: ICD-10-CM

## 2017-12-22 DIAGNOSIS — M10.9 GOUT, UNSPECIFIED CAUSE, UNSPECIFIED CHRONICITY, UNSPECIFIED SITE: ICD-10-CM

## 2017-12-22 DIAGNOSIS — I10 ESSENTIAL HYPERTENSION: Primary | ICD-10-CM

## 2017-12-22 DIAGNOSIS — E78.5 HYPERLIPIDEMIA, UNSPECIFIED HYPERLIPIDEMIA TYPE: ICD-10-CM

## 2017-12-22 RX ORDER — FUROSEMIDE 20 MG/1
20 TABLET ORAL DAILY
Qty: 30 TAB | Refills: 2 | Status: SHIPPED | OUTPATIENT
Start: 2017-12-22 | End: 2018-02-23 | Stop reason: SDUPTHER

## 2017-12-22 NOTE — PROGRESS NOTES
HISTORY OF PRESENT ILLNESS  Nidia Tracy is a 50 y.o. male. HPI  Presents for f/u HTN, lipids, gout    Increased LE edema on higher dose norvasc  So much so he could not walk for day  Reduced to 5 mg norvasc with some improvement, yet mild persistent swelling    No orthopnea or PND    tikosyn being down titrated by cards  Another 6 weeks to come off altogether is planned. Transient, left sided CP episode at rest  No exertional sx  No SOB    Had stress testing without ischemia within the past 2 months    Past medical, Social, and Family history reviewed  Medications reviewed and updated. ROS  Complete ROS reviewed and negative or stable except as noted in HPI. Physical Exam   Constitutional: He is oriented to person, place, and time. He appears well-nourished. No distress. HENT:   Head: Normocephalic and atraumatic. Mouth/Throat: Oropharynx is clear and moist. No oropharyngeal exudate. Eyes: EOM are normal. Pupils are equal, round, and reactive to light. No scleral icterus. Neck: Normal range of motion. Neck supple. No JVD present. No thyromegaly present. Cardiovascular: Normal rate, regular rhythm and normal heart sounds. Exam reveals no gallop and no friction rub. No murmur heard. Pulmonary/Chest: Effort normal and breath sounds normal. No respiratory distress. He has no wheezes. He has no rales. Abdominal: Soft. Bowel sounds are normal. He exhibits no distension. There is no tenderness. Musculoskeletal: Normal range of motion. He exhibits edema (1-2+ bilat). Lymphadenopathy:     He has no cervical adenopathy. Neurological: He is alert and oriented to person, place, and time. He exhibits normal muscle tone. Coordination normal.   Skin: Skin is warm. No rash noted. Psychiatric: He has a normal mood and affect. Nursing note and vitals reviewed. Prior labs reviewed. Reviewed prior imaging reports    ASSESSMENT and PLAN    ICD-10-CM ICD-9-CM    1.  Essential hypertension I10 401.9 furosemide (LASIX) 20 mg tablet   2. Hyperlipidemia, unspecified hyperlipidemia type E78.5 272.4    3. Gout, unspecified cause, unspecified chronicity, unspecified site M10.9 274.9    4. Type 2 diabetes mellitus without complication, without long-term current use of insulin (HCC) E11.9 250.00    5. S/P ablation of atrial fibrillation Z98.890 V45.89     Z86.79       Follow-up Disposition:  Return in about 2 months (around 2/22/2018), or if symptoms worsen or fail to improve, for blood pressure, cholesterol. results and schedule of future studies reviewed with patient  reviewed diet, exercise and weight    cardiovascular risk and specific lipid/LDL goals reviewed  reviewed medications and side effects in detail   Allopurinol is contraindicated due to drug interaction  Get PA on uloric   Add lasix for edema - may adjust to every other day if able   5 mg norvasc for now  Trial of higher norvasc dose (10 mg) pending response to lasix for better HTN control  Consider long term HCTZ if get on Uloric and off tikosyn  Continue lisinopril and metoprolol  May be able to titrate up on metoprolol once off tikosyn as well - currently HR limits safe titration  Monitor CP  Reviewed change to crestor instead of pravastatin.

## 2017-12-22 NOTE — MR AVS SNAPSHOT
Visit Information Date & Time Provider Department Dept. Phone Encounter #  
 12/22/2017 11:45 AM Diya Pat, [address] [place] [address] and Internal Medicine [phone] [id number] Follow-up Instructions Return in about 2 months (around 2/22/2018), or if symptoms worsen or fail to improve, for blood pressure, cholesterol. Upcoming Health Maintenance Date Due  
 EYE EXAM RETINAL OR DILATED Q1 5/5/2016 FOOT EXAM Q1 12/30/2016 HEMOGLOBIN A1C Q6M 4/26/2018 MICROALBUMIN Q1 10/26/2018 LIPID PANEL Q1 11/29/2018 COLONOSCOPY 1/1/2023 DTaP/Tdap/Td series (2 - Td) 12/12/2024 Allergies as of 12/22/2017  Review Complete On: 12/22/2017 By: Diya Pat MD  
  
 Severity Noted Reaction Type Reactions Fiorinal [Butalbital-aspirin-caffeine]  05/06/2013    Unknown (comments) Throat swelling Current Immunizations  Reviewed on 10/17/2016 Name Date Tdap 12/12/2014 Not reviewed this visit You Were Diagnosed With   
  
 Codes Comments Essential hypertension    -  Primary ICD-10-CM: I10 
ICD-9-CM: 401.9 Hyperlipidemia, unspecified hyperlipidemia type     ICD-10-CM: E78.5 ICD-9-CM: 272.4 Gout, unspecified cause, unspecified chronicity, unspecified site     ICD-10-CM: M10.9 ICD-9-CM: 274.9 Type 2 diabetes mellitus without complication, without long-term current use of insulin (HCC)     ICD-10-CM: E11.9 ICD-9-CM: 250.00 Vitals BP Pulse Temp Resp Height(growth percentile) Weight(growth percentile) (!) 150/100 (BP 1 Location: Right arm, BP Patient Position: Sitting) (!) 47 97.7 °F (36.5 °C) (Oral) 18 5' 11\" (1.803 m) 248 lb (112.5 kg) SpO2 BMI Smoking Status 97% 34.59 kg/m2 Former Smoker Vitals History BMI and BSA Data Body Mass Index Body Surface Area 34.59 kg/m 2 2.37 m 2 Preferred Pharmacy Pharmacy Name Phone  [id number] Alanna [place]ang, [address] [phone] Your Updated Medication List  
  
   
This list is accurate as of: 12/22/17  1:27 PM.  Always use your most recent med list. amLODIPine 10 mg tablet Commonly known as:  Jude Presser Take 1 Tab by mouth daily. AUGMENTIN 875-125 mg per tablet Generic drug:  amoxicillin-clavulanate Take  by mouth every twelve (12) hours. colchicine 0.6 mg tablet Take 0.6 mg by mouth two (2) times a day. dofetilide 500 mcg capsule Commonly known as:  TIKOSYN  
  
 febuxostat 80 mg Tab tablet Commonly known as:  Jeris Merritts Take 1 Tab by mouth daily. furosemide 20 mg tablet Commonly known as:  LASIX Take 1 Tab by mouth daily. lisinopril 40 mg tablet Commonly known as:  PRINIVIL, ZESTRIL  
TAKE 1 TABLET BY MOUTH DAILY  
  
 metFORMIN 1,000 mg tablet Commonly known as:  GLUCOPHAGE  
TAKE 1 TABLET BY MOUTH 2 TIMES DAILY WITH MEALS  
  
 metoprolol succinate 50 mg XL tablet Commonly known as:  TOPROL-XL Take 1 Tab by mouth daily. Indications: should be 50 mg per pt  
  
 pantoprazole 40 mg tablet Commonly known as:  PROTONIX  
  
 rosuvastatin 20 mg tablet Commonly known as:  CRESTOR Take 1 Tab by mouth nightly. triamcinolone acetonide 0.1 % topical cream  
Commonly known as:  KENALOG Apply  to affected area two (2) times a day. use thin layer XARELTO 20 mg Tab tablet Generic drug:  rivaroxaban  
  
  
  
  
Prescriptions Sent to Pharmacy Refills  
 furosemide (LASIX) 20 mg tablet 2 Sig: Take 1 Tab by mouth daily. Class: Normal  
 Pharmacy: [facility]/pharmacy #[id number]  [place], [address] [place] [address] Ph #: [phone] Route: Oral  
  
Follow-up Instructions Return in about 2 months (around 2/22/2018), or if symptoms worsen or fail to improve, for blood pressure, cholesterol. Introducing [facility] & HEALTH SERVICES!    
 [employer] introduces [employer] patient portal. Now you can access parts of your medical record, email your doctor's office, and request medication refills online. 1. In your internet browser, go to [email or web address] 2. Click on the First Time User? Click Here link in the Sign In box. You will see the New Member Sign Up page. 3. Enter your [employer] Access Code exactly as it appears below. You will not need to use this code after youve completed the sign-up process. If you do not sign up before the expiration date, you must request a new code. · [employer] Access Code: [email or web address] Expires: 2/27/2018 10:14 AM 
 
4. Enter the last four digits of your Social Security Number (xxxx) and Date of Birth (mm/dd/yyyy) as indicated and click Submit. You will be taken to the next sign-up page. 5. Create a [employer] ID. This will be your [employer] login ID and cannot be changed, so think of one that is secure and easy to remember. 6. Create a [employer] password. You can change your password at any time. 7. Enter your Password Reset Question and Answer. This can be used at a later time if you forget your password. 8. Enter your e-mail address. You will receive e-mail notification when new information is available in [address] 9. Click Sign Up. You can now view and download portions of your medical record. 10. Click the Download Summary menu link to download a portable copy of your medical information. If you have questions, please visit the Frequently Asked Questions section of the [employer] website. Remember, [employer] is NOT to be used for urgent needs. For medical emergencies, dial 911. Now available from your iPhone and Android! Please provide this summary of care documentation to your next provider. Your primary care clinician is listed as [address] If you have any questions after today's visit, please call [phone].

## 2017-12-22 NOTE — PROGRESS NOTES
Rm 15    Chief Complaint   Patient presents with    Hypertension     follow up     3 week follow up for hypertension       Health Maintenance Due   Topic Date Due    EYE EXAM RETINAL OR DILATED Q1  05/05/2016    FOOT EXAM Q1  12/30/2016     Eye and foot exam due      1. Have you been to the ER, urgent care clinic since your last visit? Hospitalized since your last visit?no    2. Have you seen or consulted any other health care providers outside of the [address] [place] [address] since your last visit? Include any pap smears or colon screening.  no       Learning Assessment 10/26/2017   PRIMARY LEARNER Patient   HIGHEST LEVEL OF EDUCATION - PRIMARY LEARNER  GRADUATED HIGH SCHOOL OR GED   BARRIERS PRIMARY LEARNER NONE   PRIMARY LANGUAGE ENGLISH   LEARNER PREFERENCE PRIMARY READING   ANSWERED BY patient'   RELATIONSHIP SELF     PHQ over the last two weeks 10/26/2017   Little interest or pleasure in doing things Not at all   Feeling down, depressed or hopeless Not at all   Total Score PHQ 2 0     Living medical will information given at previous visit

## 2018-02-23 ENCOUNTER — OFFICE VISIT (OUTPATIENT)
Dept: INTERNAL MEDICINE CLINIC | Age: 53
End: 2018-02-23

## 2018-02-23 VITALS
DIASTOLIC BLOOD PRESSURE: 78 MMHG | BODY MASS INDEX: 35.87 KG/M2 | WEIGHT: 256.2 LBS | OXYGEN SATURATION: 98 % | TEMPERATURE: 98.1 F | HEIGHT: 71 IN | RESPIRATION RATE: 16 BRPM | HEART RATE: 53 BPM | SYSTOLIC BLOOD PRESSURE: 139 MMHG

## 2018-02-23 DIAGNOSIS — M10.9 GOUT, UNSPECIFIED CAUSE, UNSPECIFIED CHRONICITY, UNSPECIFIED SITE: ICD-10-CM

## 2018-02-23 DIAGNOSIS — E78.5 HYPERLIPIDEMIA, UNSPECIFIED HYPERLIPIDEMIA TYPE: ICD-10-CM

## 2018-02-23 DIAGNOSIS — Z86.79 S/P ABLATION OF ATRIAL FIBRILLATION: ICD-10-CM

## 2018-02-23 DIAGNOSIS — E11.9 TYPE 2 DIABETES MELLITUS WITHOUT COMPLICATION, WITHOUT LONG-TERM CURRENT USE OF INSULIN (HCC): ICD-10-CM

## 2018-02-23 DIAGNOSIS — Z98.890 S/P ABLATION OF ATRIAL FIBRILLATION: ICD-10-CM

## 2018-02-23 DIAGNOSIS — I10 ESSENTIAL HYPERTENSION: Primary | ICD-10-CM

## 2018-02-23 RX ORDER — FUROSEMIDE 20 MG/1
20 TABLET ORAL DAILY
Qty: 90 TAB | Refills: 1 | Status: SHIPPED | OUTPATIENT
Start: 2018-02-23 | End: 2018-10-31 | Stop reason: ALTCHOICE

## 2018-02-23 RX ORDER — AMLODIPINE BESYLATE 5 MG/1
5 TABLET ORAL DAILY
Qty: 90 TAB | Refills: 1 | Status: SHIPPED | OUTPATIENT
Start: 2018-02-23

## 2018-02-23 RX ORDER — COLCHICINE 0.6 MG/1
0.6 TABLET ORAL 2 TIMES DAILY
Qty: 60 TAB | Refills: 2 | Status: SHIPPED | OUTPATIENT
Start: 2018-02-23

## 2018-02-23 RX ORDER — ALLOPURINOL 100 MG/1
100 TABLET ORAL DAILY
Qty: 90 TAB | Refills: 1 | Status: SHIPPED | OUTPATIENT
Start: 2018-02-23 | End: 2018-08-26 | Stop reason: SDUPTHER

## 2018-02-23 NOTE — MR AVS SNAPSHOT
[address] [facility] [place] [address] 
[phone] Patient: Jerald Bunch 
MRN: [id number] :1965 Visit Information Date & Time Provider Department Dept. Phone Encounter #  
 2018  9:30 AM Marek Rasmussen MD [facility] [phone] [id number] Follow-up Instructions Return in about 2 months (around 2018), or if symptoms worsen or fail to improve, for diabetes, cholesterol, blood pressure, gout. Upcoming Health Maintenance Date Due  
 EYE EXAM RETINAL OR DILATED Q1 2016 FOOT EXAM Q1 2016 HEMOGLOBIN A1C Q6M 2018 MICROALBUMIN Q1 10/26/2018 LIPID PANEL Q1 2018 COLONOSCOPY 2023 DTaP/Tdap/Td series (2 - Td) 2024 Allergies as of 2018  Review Complete On: 2018 By: Marek Rasmussen MD  
  
 Severity Noted Reaction Type Reactions Fiorinal [Butalbital-aspirin-caffeine]  2013    Unknown (comments) Throat swelling Current Immunizations  Reviewed on 10/17/2016 Name Date Tdap 2014 Not reviewed this visit You Were Diagnosed With   
  
 Codes Comments Essential hypertension    -  Primary ICD-10-CM: I10 
ICD-9-CM: 401.9 Type 2 diabetes mellitus without complication, without long-term current use of insulin (HCC)     ICD-10-CM: E11.9 ICD-9-CM: 250.00 Hyperlipidemia, unspecified hyperlipidemia type     ICD-10-CM: E78.5 ICD-9-CM: 272.4 Gout, unspecified cause, unspecified chronicity, unspecified site     ICD-10-CM: M10.9 ICD-9-CM: 274.9 S/P ablation of atrial fibrillation     ICD-10-CM: Z98.890, Z86.79 
ICD-9-CM: V45.89 Vitals BP Pulse Temp Resp Height(growth percentile) Weight(growth percentile) 139/78 (BP 1 Location: Left arm, BP Patient Position: Sitting) (!) 53 98.1 °F (36.7 °C) (Oral) 16 5' 11\" (1.803 m) 256 lb 3.2 oz (116.2 kg) SpO2 BMI Smoking Status 98% 35.73 kg/m2 Former Smoker BMI and BSA Data Body Mass Index Body Surface Area 35.73 kg/m 2 2.41 m 2 Preferred Pharmacy Pharmacy Name Phone [facility]/PHARMACY #[id number] Lindsey Hooker [phone] Your Updated Medication List  
  
   
This list is accurate as of 2/23/18 11:23 AM.  Always use your most recent med list.  
  
  
  
  
 allopurinol 100 mg tablet Commonly known as:  Penrose Ros Take 1 Tab by mouth daily. amLODIPine 5 mg tablet Commonly known as:  Lynnell Manual Take 1 Tab by mouth daily. AUGMENTIN 875-125 mg per tablet Generic drug:  amoxicillin-clavulanate Take  by mouth every twelve (12) hours. colchicine 0.6 mg tablet Take 1 Tab by mouth two (2) times a day. X 1 month then prn  
  
 dofetilide 500 mcg capsule Commonly known as:  TIKOSYN  
  
 furosemide 20 mg tablet Commonly known as:  LASIX Take 1 Tab by mouth daily. lisinopril 40 mg tablet Commonly known as:  PRINIVIL, ZESTRIL  
TAKE 1 TABLET BY MOUTH DAILY  
  
 metFORMIN 1,000 mg tablet Commonly known as:  GLUCOPHAGE  
TAKE 1 TABLET BY MOUTH 2 TIMES DAILY WITH MEALS  
  
 metoprolol succinate 50 mg XL tablet Commonly known as:  TOPROL-XL Take 1 Tab by mouth daily. Indications: should be 50 mg per pt  
  
 pantoprazole 40 mg tablet Commonly known as:  PROTONIX  
  
 rosuvastatin 20 mg tablet Commonly known as:  CRESTOR Take 1 Tab by mouth nightly. triamcinolone acetonide 0.1 % topical cream  
Commonly known as:  KENALOG Apply  to affected area two (2) times a day. use thin layer XARELTO 20 mg Tab tablet Generic drug:  rivaroxaban  
  
  
  
  
Prescriptions Sent to Pharmacy Refills  
 allopurinol (ZYLOPRIM) 100 mg tablet 1 Sig: Take 1 Tab by mouth daily. Class: Normal  
 Pharmacy: [facility]/pharmacy #[id number] Ernestine Reinoso [address] [place] [address] [facility] #: [phone] Route: Oral  
 colchicine 0.6 mg tablet 2 Sig: Take 1 Tab by mouth two (2) times a day. X 1 month then prn Class: Normal  
 Pharmacy: [facility]/pharmacy #[id number] Ambrosio Hurley [facility] Ph #: [phone] Route: Oral  
 amLODIPine (NORVASC) 5 mg tablet 1 Sig: Take 1 Tab by mouth daily. Class: Normal  
 Pharmacy: [facility]/pharmacy #[id number]  Ambrosio Hercules [facility] Ph #: [phone] Route: Oral  
 furosemide (LASIX) 20 mg tablet 1 Sig: Take 1 Tab by mouth daily. Class: Normal  
 Pharmacy: [facility]/pharmacy #[id number]  Ambrosio Hercules [facility] Ph #: [phone] Route: Oral  
  
We Performed the Following  DIABETES FOOT EXAM [HM7 Custom] MAGNESIUM F5610120 CPT(R)] METABOLIC PANEL, COMPREHENSIVE [46556 CPT(R)] URIC ACID Q4853802 CPT(R)] Follow-up Instructions Return in about 2 months (around 4/23/2018), or if symptoms worsen or fail to improve, for diabetes, cholesterol, blood pressure, gout. Introducing hospitals & HEALTH SERVICES! Ida Mason introduces el? patient portal. Now you can access parts of your medical record, email your doctor's office, and request medication refills online. 1. In your internet browser, go to [email or web address] 2. Click on the First Time User? Click Here link in the Sign In box. You will see the New Member Sign Up page. 3. Enter your el? Access Code exactly as it appears below. You will not need to use this code after youve completed the sign-up process. If you do not sign up before the expiration date, you must request a new code. · el? Access Code: [email or web address] Expires: 2/27/2018 10:14 AM 
 
4. Enter the last four digits of your Social Security Number (xxxx) and Date of Birth (mm/dd/yyyy) as indicated and click Submit. You will be taken to the next sign-up page. 5. Create a el? ID.  This will be your el? login ID and cannot be changed, so think of one that is secure and easy to remember. 6. Create a Leapfunder password. You can change your password at any time. 7. Enter your Password Reset Question and Answer. This can be used at a later time if you forget your password. 8. Enter your e-mail address. You will receive e-mail notification when new information is available in [address] 9. Click Sign Up. You can now view and download portions of your medical record. 10. Click the Download Summary menu link to download a portable copy of your medical information. If you have questions, please visit the Frequently Asked Questions section of the [employer] website. Remember, Leap[employer]der is NOT to be used for urgent needs. For medical emergencies, dial 911. Now available from your iPhone and Android! Please provide this summary of care documentation to your next provider. Your primary care clinician is listed as [address] If you have any questions after today's visit, please call [phone].

## 2018-02-23 NOTE — PROGRESS NOTES
Rm 15    Chief Complaint   Patient presents with    Follow-up     cholesterol    Blood Pressure Check     pt has not had BP meds today     1. Have you been to the ER, urgent care clinic since your last visit? Hospitalized since your last visit? No    2. Have you seen or consulted any other health care providers outside of the [facility] since your last visit? Include any pap smears or colon screening. No  Health Maintenance Due   Topic Date Due    EYE EXAM RETINAL OR DILATED Q1  05/05/2016    FOOT EXAM Q1  12/30/2016       PHQ over the last two weeks 2/23/2018   Little interest or pleasure in doing things Not at all   Feeling down, depressed or hopeless Not at all   Total Score PHQ 2 0

## 2018-02-23 NOTE — PROGRESS NOTES
HPI:  Presents for f/u gout, HTN, lipids    Has not started crestor yet    uloric denied by [employer]    Requiring lasix daily to maintain edema from 5 mg norvasc    No CP, SOB, neuro sx      Past medical, Social, and Family history reviewed    Prior to Admission medications    Medication Sig Start Date End Date Taking? Authorizing Provider   furosemide (LASIX) 20 mg tablet Take 1 Tab by mouth daily. 12/22/17  Yes Pito Moran MD   amLODIPine (NORVASC) 10 mg tablet Take 1 Tab by mouth daily. Patient taking differently: Take 5 mg by mouth daily. 11/29/17  Yes Pito Moran MD   metFORMIN (GLUCOPHAGE) 1,000 mg tablet TAKE 1 TABLET BY MOUTH 2 TIMES DAILY WITH MEALS 10/30/17  Yes Pito Moran MD   metoprolol succinate (TOPROL-XL) 50 mg XL tablet Take 1 Tab by mouth daily. Indications: should be 50 mg per pt 10/26/17  Yes Pito Moran MD   lisinopril (PRINIVIL, ZESTRIL) 40 mg tablet TAKE 1 TABLET BY MOUTH DAILY 10/7/17  Yes Pito Moran MD   XARELTO 20 mg tab tablet  8/24/17  Yes Historical Provider   triamcinolone acetonide (KENALOG) 0.1 % topical cream Apply  to affected area two (2) times a day. use thin layer 12/12/14  Yes Pito Moran MD   rosuvastatin (CRESTOR) 20 mg tablet Take 1 Tab by mouth nightly. 11/30/17   Pito Moran MD   pantoprazole (PROTONIX) 40 mg tablet  11/6/17   Historical Provider   febuxostat (ULORIC) 80 mg tab tablet Take 1 Tab by mouth daily. 11/29/17   Pito Moran MD   dofetilide Sydell Inks) 500 mcg capsule  8/24/17   Historical Provider   amoxicillin-clavulanate (AUGMENTIN) 875-125 mg per tablet Take  by mouth every twelve (12) hours. Historical Provider   colchicine 0.6 mg tablet Take 0.6 mg by mouth two (2) times a day. Historical Provider          ROS  Complete ROS reviewed and negative or stable except as noted in HPI. Physical Exam   Constitutional: He is oriented to person, place, and time. He appears well-nourished.  No distress. HENT:   Head: Normocephalic and atraumatic. Mouth/Throat: Oropharynx is clear and moist. No oropharyngeal exudate. Eyes: EOM are normal. Pupils are equal, round, and reactive to light. No scleral icterus. Neck: Normal range of motion. Neck supple. No JVD present. No thyromegaly present. Cardiovascular: Normal rate, regular rhythm and normal heart sounds. Exam reveals no gallop and no friction rub. No murmur heard. Pulmonary/Chest: Effort normal and breath sounds normal. No respiratory distress. He has no wheezes. He has no rales. Abdominal: Soft. Bowel sounds are normal. He exhibits no distension. There is no tenderness. Musculoskeletal: Normal range of motion. Diabetic foot exam:     Left: Sharp/dull discrimination normal    Filament test normal sensation with micro filament   Pulse DP: 2+ (normal)   Deformities: None  Right: Sharp/dull discrimination normal   Filament test normal sensation with micro filament   Pulse DP: 2+ (normal)   Deformities: None   Lymphadenopathy:     He has no cervical adenopathy. Neurological: He is alert and oriented to person, place, and time. He exhibits normal muscle tone. Coordination normal.   Skin: Skin is warm. No rash noted. Psychiatric: He has a normal mood and affect. Nursing note and vitals reviewed. Prior labs reviewed. Assessment/Plan:    ICD-10-CM ICD-9-CM    1. Essential hypertension I10 401.9 amLODIPine (NORVASC) 5 mg tablet      furosemide (LASIX) 20 mg tablet      METABOLIC PANEL, COMPREHENSIVE      MAGNESIUM   2. Type 2 diabetes mellitus without complication, without long-term current use of insulin ([facility]) E11.9 250.00  DIABETES FOOT EXAM   3. Hyperlipidemia, unspecified hyperlipidemia type E78.5 272.4    4. Gout, unspecified cause, unspecified chronicity, unspecified site M10.9 274.9 URIC ACID   5.  S/P ablation of atrial fibrillation Z98.890 V45.89     Z86.79       Follow-up Disposition:  Return in about 2 months (around 4/23/2018), or if symptoms worsen or fail to improve, for diabetes, cholesterol, blood pressure, gout.    results and schedule of future studies reviewed with patient  reviewed diet, exercise and weight   cardiovascular risk and specific lipid/LDL goals reviewed  reviewed medications and side effects in detail  Allopurinol started  Colchicine for a few weeks scheduled  Scheduled lasix  Lipids next time  Lytes today

## 2018-02-24 LAB
ALBUMIN SERPL-MCNC: 4.8 G/DL (ref 3.5–5.5)
ALBUMIN/GLOB SERPL: 1.7 {RATIO} (ref 1.2–2.2)
ALP SERPL-CCNC: 49 IU/L (ref 39–117)
ALT SERPL-CCNC: 114 IU/L (ref 0–44)
AST SERPL-CCNC: 67 IU/L (ref 0–40)
BILIRUB SERPL-MCNC: 0.5 MG/DL (ref 0–1.2)
BUN SERPL-MCNC: 14 MG/DL (ref 6–24)
BUN/CREAT SERPL: 22 (ref 9–20)
CALCIUM SERPL-MCNC: 9.8 MG/DL (ref 8.7–10.2)
CHLORIDE SERPL-SCNC: 100 MMOL/L (ref 96–106)
CO2 SERPL-SCNC: 26 MMOL/L (ref 18–29)
CREAT SERPL-MCNC: 0.65 MG/DL (ref 0.76–1.27)
GLOBULIN SER CALC-MCNC: 2.8 G/DL (ref 1.5–4.5)
GLUCOSE SERPL-MCNC: 128 MG/DL (ref 65–99)
MAGNESIUM SERPL-MCNC: 1.7 MG/DL (ref 1.6–2.3)
POTASSIUM SERPL-SCNC: 4.6 MMOL/L (ref 3.5–5.2)
PROT SERPL-MCNC: 7.6 G/DL (ref 6–8.5)
SODIUM SERPL-SCNC: 142 MMOL/L (ref 134–144)
URATE SERPL-MCNC: 7.4 MG/DL (ref 3.7–8.6)

## 2018-04-05 RX ORDER — LISINOPRIL 40 MG/1
TABLET ORAL
Qty: 90 TAB | Refills: 1 | Status: SHIPPED | OUTPATIENT
Start: 2018-04-05 | End: 2018-10-08 | Stop reason: SDUPTHER

## 2018-04-26 ENCOUNTER — OFFICE VISIT (OUTPATIENT)
Dept: INTERNAL MEDICINE CLINIC | Age: 53
End: 2018-04-26

## 2018-04-26 VITALS
TEMPERATURE: 98.3 F | RESPIRATION RATE: 16 BRPM | HEIGHT: 71 IN | SYSTOLIC BLOOD PRESSURE: 136 MMHG | DIASTOLIC BLOOD PRESSURE: 78 MMHG | WEIGHT: 257.4 LBS | BODY MASS INDEX: 36.03 KG/M2 | HEART RATE: 59 BPM | OXYGEN SATURATION: 97 %

## 2018-04-26 DIAGNOSIS — Z12.5 PROSTATE CANCER SCREENING: ICD-10-CM

## 2018-04-26 DIAGNOSIS — Z98.890 S/P ABLATION OF ATRIAL FIBRILLATION: ICD-10-CM

## 2018-04-26 DIAGNOSIS — M10.9 GOUT, UNSPECIFIED CAUSE, UNSPECIFIED CHRONICITY, UNSPECIFIED SITE: ICD-10-CM

## 2018-04-26 DIAGNOSIS — E11.9 TYPE 2 DIABETES MELLITUS WITHOUT COMPLICATION, WITHOUT LONG-TERM CURRENT USE OF INSULIN (HCC): Primary | ICD-10-CM

## 2018-04-26 DIAGNOSIS — E11.21 TYPE 2 DIABETES WITH NEPHROPATHY (HCC): ICD-10-CM

## 2018-04-26 DIAGNOSIS — E78.5 HYPERLIPIDEMIA, UNSPECIFIED HYPERLIPIDEMIA TYPE: ICD-10-CM

## 2018-04-26 DIAGNOSIS — I10 ESSENTIAL HYPERTENSION: ICD-10-CM

## 2018-04-26 DIAGNOSIS — Z86.79 S/P ABLATION OF ATRIAL FIBRILLATION: ICD-10-CM

## 2018-04-26 PROBLEM — E66.01 SEVERE OBESITY (BMI 35.0-39.9) WITH COMORBIDITY (HCC): Status: ACTIVE | Noted: 2018-04-26

## 2018-04-26 LAB — HBA1C MFR BLD HPLC: 5.7 % (ref 4.8–5.6)

## 2018-04-26 RX ORDER — METOPROLOL SUCCINATE 50 MG/1
50 TABLET, EXTENDED RELEASE ORAL DAILY
Qty: 90 TAB | Refills: 1 | Status: SHIPPED | OUTPATIENT
Start: 2018-04-26 | End: 2018-11-03 | Stop reason: SDUPTHER

## 2018-04-26 NOTE — PROGRESS NOTES
Rm 14    Chief Complaint   Patient presents with    Diabetes    Blood Pressure Check    Follow-up     gout and cholesterol     1. Have you been to the ER, urgent care clinic since your last visit? Hospitalized since your last visit? No    2. Have you seen or consulted any other health care providers outside of the [facility] since your last visit? Include any pap smears or colon screening.  No    Health Maintenance Due   Topic Date Due    EYE EXAM RETINAL OR DILATED Q1  05/05/2016    HEMOGLOBIN A1C Q6M  04/26/2018     PHQ over the last two weeks 2/23/2018   Little interest or pleasure in doing things Not at all   Feeling down, depressed or hopeless Not at all   Total Score PHQ 2 0     Learning Assessment 10/26/2017   PRIMARY LEARNER Patient   HIGHEST LEVEL OF EDUCATION - PRIMARY LEARNER  GRADUATED HIGH SCHOOL OR GED   BARRIERS PRIMARY LEARNER NONE   PRIMARY LANGUAGE ENGLISH   LEARNER PREFERENCE PRIMARY READING   ANSWERED BY patient'   RELATIONSHIP SELF

## 2018-04-26 NOTE — PROGRESS NOTES
HPI:  Presents for f/u HTN, gout, lipids    Did not tolerate scheduled colchicine due to diarrhea  Gout controlled at this point on allopurinol, though. +med compliance    Working on diet and exercise    No CP, SOB, neuro sx        Past medical, Social, and Family history reviewed    Prior to Admission medications    Medication Sig Start Date End Date Taking? Authorizing Provider   lisinopril (PRINIVIL, ZESTRIL) 40 mg tablet TAKE 1 TABLET BY MOUTH DAILY 4/5/18  Yes Bobby Love MD   allopurinol (ZYLOPRIM) 100 mg tablet Take 1 Tab by mouth daily. 2/23/18  Yes Bobby Love MD   colchicine 0.6 mg tablet Take 1 Tab by mouth two (2) times a day. X 1 month then prn 2/23/18  Yes Bobby Love MD   amLODIPine (NORVASC) 5 mg tablet Take 1 Tab by mouth daily. 2/23/18  Yes Bobby Love MD   furosemide (LASIX) 20 mg tablet Take 1 Tab by mouth daily. 2/23/18  Yes Bobby Love MD   rosuvastatin (CRESTOR) 20 mg tablet Take 1 Tab by mouth nightly. 11/30/17  Yes Bobby Love MD   pantoprazole (PROTONIX) 40 mg tablet  11/6/17  Yes Historical Provider   metFORMIN (GLUCOPHAGE) 1,000 mg tablet TAKE 1 TABLET BY MOUTH 2 TIMES DAILY WITH MEALS 10/30/17  Yes Bobby Love MD   metoprolol succinate (TOPROL-XL) 50 mg XL tablet Take 1 Tab by mouth daily. Indications: should be 50 mg per pt 10/26/17  Yes Bobby Love MD   XARELTO 20 mg tab tablet  8/24/17  Yes Historical Provider   triamcinolone acetonide (KENALOG) 0.1 % topical cream Apply  to affected area two (2) times a day. use thin layer 12/12/14  Yes Bobby Love MD   amoxicillin-clavulanate (AUGMENTIN) 875-125 mg per tablet Take  by mouth every twelve (12) hours. Historical Provider          ROS  Complete ROS reviewed and negative or stable except as noted in HPI. Physical Exam   Constitutional: He is oriented to person, place, and time. He appears well-nourished. No distress. HENT:   Head: Normocephalic and atraumatic. Mouth/Throat: Oropharynx is clear and moist. No oropharyngeal exudate. Eyes: EOM are normal. Pupils are equal, round, and reactive to light. No scleral icterus. Neck: Normal range of motion. Neck supple. No JVD present. No thyromegaly present. Cardiovascular: Normal rate, regular rhythm and normal heart sounds. Exam reveals no gallop and no friction rub. No murmur heard. Pulmonary/Chest: Effort normal and breath sounds normal. No respiratory distress. He has no wheezes. He has no rales. Abdominal: Soft. Bowel sounds are normal. He exhibits no distension. There is no tenderness. Musculoskeletal: Normal range of motion. He exhibits no edema. Lymphadenopathy:     He has no cervical adenopathy. Neurological: He is alert and oriented to person, place, and time. He exhibits normal muscle tone. Coordination normal.   Skin: Skin is warm. No rash noted. Psychiatric: He has a normal mood and affect. Nursing note and vitals reviewed. Prior labs reviewed. Assessment/Plan:    ICD-10-CM ICD-9-CM    1. Type 2 diabetes mellitus without complication, without long-term current use of insulin ([facility]) E11.9 250.00 AMB POC HEMOGLOBIN A1C   2. Type 2 diabetes with nephropathy ([facility]) E11.21 250.40      583.81    3. Gout, unspecified cause, unspecified chronicity, unspecified site M10.9 274.9 URIC ACID   4. S/P ablation of atrial fibrillation Z98.890 V45.89     Z86.79     5. Hyperlipidemia, unspecified hyperlipidemia type E78.5 272.4 CK      LIPID PANEL      METABOLIC PANEL, COMPREHENSIVE   6. Essential hypertension I10 401.9 CBC WITH AUTOMATED DIFF   7. Prostate cancer screening Z12.5 V76.44 PSA, DIAGNOSTIC (PROSTATE SPECIFIC AG)     Follow-up Disposition:  Return in about 6 months (around 10/26/2018), or if symptoms worsen or fail to improve, for blood pressure, gout.    results and schedule of future studies reviewed with patient  reviewed diet, exercise and weight   cardiovascular risk and specific lipid/LDL goals reviewed  reviewed medications and side effects in detail  Continue current medications

## 2018-04-26 NOTE — MR AVS SNAPSHOT
[address] [facility] [place] [address] 
[phone] Patient: Niurka Aguilar 
MRN: [id number] :1965 Visit Information Date & Time Provider Department Dept. Phone Encounter #  
 2018  8:45 AM Marcie Briones [address] and Internal Medicine [phone] [id number] Follow-up Instructions Return in about 6 months (around 10/26/2018), or if symptoms worsen or fail to improve, for blood pressure, gout. Upcoming Health Maintenance Date Due  
 EYE EXAM RETINAL OR DILATED Q1 2016 HEMOGLOBIN A1C Q6M 10/26/2018 MICROALBUMIN Q1 10/26/2018 LIPID PANEL Q1 2018 FOOT EXAM Q1 2019 COLONOSCOPY 2023 DTaP/Tdap/Td series (2 - Td) 2024 Allergies as of 2018  Review Complete On: 2018 By: Libertad Simmons MD  
  
 Severity Noted Reaction Type Reactions Fiorinal [Butalbital-aspirin-caffeine]  2013    Unknown (comments) Throat swelling Current Immunizations  Reviewed on 10/17/2016 Name Date Tdap 2014 Not reviewed this visit You Were Diagnosed With   
  
 Codes Comments Type 2 diabetes mellitus without complication, without long-term current use of insulin (HCC)    -  Primary ICD-10-CM: E11.9 ICD-9-CM: 250.00 Type 2 diabetes with nephropathy (HCC)     ICD-10-CM: E11.21 
ICD-9-CM: 250.40, 583.81 Gout, unspecified cause, unspecified chronicity, unspecified site     ICD-10-CM: M10.9 ICD-9-CM: 274.9 S/P ablation of atrial fibrillation     ICD-10-CM: Z98.890, Z86.79 
ICD-9-CM: V45.89 Hyperlipidemia, unspecified hyperlipidemia type     ICD-10-CM: E78.5 ICD-9-CM: 272.4 Essential hypertension     ICD-10-CM: I10 
ICD-9-CM: 401.9 Prostate cancer screening     ICD-10-CM: Z12.5 ICD-9-CM: V76.44 Vitals BP Pulse Temp Resp Height(growth percentile) Weight(growth percentile) 136/78 (!) 59 98.3 °F (36.8 °C) (Oral) 16 5' 11\" (1.803 m) 257 lb 6.4 oz (116.8 kg) SpO2 BMI Smoking Status 97% 35.9 kg/m2 Former Smoker Vitals History BMI and BSA Data Body Mass Index Body Surface Area 35.9 kg/m 2 2.42 m 2 Preferred Pharmacy Pharmacy Name Phone [facility]/PHARMACY #[id number] Lindsey Antonio [phone] Your Updated Medication List  
  
   
This list is accurate as of 4/26/18  9:51 AM.  Always use your most recent med list.  
  
  
  
  
 allopurinol 100 mg tablet Commonly known as:  [place] Take 1 Tab by mouth daily. amLODIPine 5 mg tablet Commonly known as:  Alex Shield Take 1 Tab by mouth daily. AUGMENTIN 875-125 mg per tablet Generic drug:  amoxicillin-clavulanate Take  by mouth every twelve (12) hours. colchicine 0.6 mg tablet Take 1 Tab by mouth two (2) times a day. X 1 month then prn  
  
 furosemide 20 mg tablet Commonly known as:  LASIX Take 1 Tab by mouth daily. lisinopril 40 mg tablet Commonly known as:  PRINIVIL, ZESTRIL  
TAKE 1 TABLET BY MOUTH DAILY  
  
 metFORMIN 1,000 mg tablet Commonly known as:  GLUCOPHAGE  
TAKE 1 TABLET BY MOUTH 2 TIMES DAILY WITH MEALS  
  
 metoprolol succinate 50 mg XL tablet Commonly known as:  TOPROL-XL Take 1 Tab by mouth daily. Indications: should be 50 mg per pt  
  
 pantoprazole 40 mg tablet Commonly known as:  PROTONIX  
  
 rosuvastatin 20 mg tablet Commonly known as:  CRESTOR Take 1 Tab by mouth nightly. triamcinolone acetonide 0.1 % topical cream  
Commonly known as:  KENALOG Apply  to affected area two (2) times a day. use thin layer XARELTO 20 mg Tab tablet Generic drug:  rivaroxaban  
  
  
  
  
Prescriptions Sent to Pharmacy Refills  
 metoprolol succinate (TOPROL-XL) 50 mg XL tablet 1 Sig: Take 1 Tab by mouth daily. Indications: should be 50 mg per pt  Class: Normal  
 Pharmacy: [facility]/pharmacy [address] [address] #: [phone] Route: Oral  
  
We Performed the Following AMB POC HEMOGLOBIN A1C [54882 CPT(R)] Follow-up Instructions Return in about 6 months (around 10/26/2018), or if symptoms worsen or fail to improve, for blood pressure, gout. To-Do List   
 Around 04/27/2018 Lab:  CBC WITH AUTOMATED DIFF Around 04/27/2018 Lab:  CK Around 04/27/2018 Lab:  LIPID PANEL Around 04/27/2018 Lab:  METABOLIC PANEL, COMPREHENSIVE Around 04/27/2018 Lab:  PSA, DIAGNOSTIC (PROSTATE SPECIFIC AG) Around 04/27/2018 Lab:  URIC ACID Introducing [facility] & HEALTH SERVICES! Willadean Saint introduces Visualant patient portal. Now you can access parts of your medical record, email your doctor's office, and request medication refills online. 1. In your internet browser, go to [email or web address] Author [email or web address] [email or web address]/The Author Hub 2. Click on the First Time User? Click Here link in the Sign In box. You will see the New Member Sign Up page. 3. Enter your Visualant Access Code exactly as it appears below. You will not need to use this code after youve completed the sign-up process. If you do not sign up before the expiration date, you must request a new code. · Visualant Access Code: [email or web address] Expires: 6/2/2018 11:30 PM 
 
4. Enter the last four digits of your Social Security Number (xxxx) and Date of Birth (mm/dd/yyyy) as indicated and click Submit. You will be taken to the next sign-up page. 5. Create a [employer]att ID. This will be your Visualant login ID and cannot be changed, so think of one that is secure and easy to remember. 6. Create a Visualant password. You can change your password at any time. 7. Enter your Password Reset Question and Answer. This can be used at a later time if you forget your password. 8. Enter your e-mail address.  You will receive e-mail notification when new information is available in Pica[employer]. 9. Click Sign Up. You can now view and download portions of your medical record. 10. Click the Download Summary menu link to download a portable copy of your medical information. If you have questions, please visit the Frequently Asked Questions section of the Pica[employer] website. Remember, Pica8 is NOT to be used for urgent needs. For medical emergencies, dial 911. Now available from your iPhone and Android! Please provide this summary of care documentation to your next provider. Your primary care clinician is listed as [address] If you have any questions after today's visit, please call [phone].

## 2018-05-07 RX ORDER — METFORMIN HYDROCHLORIDE 1000 MG/1
TABLET ORAL
Qty: 180 TAB | Refills: 1 | Status: SHIPPED | OUTPATIENT
Start: 2018-05-07 | End: 2018-11-06 | Stop reason: SDUPTHER

## 2018-06-25 ENCOUNTER — TELEPHONE (OUTPATIENT)
Dept: INTERNAL MEDICINE CLINIC | Age: 53
End: 2018-06-25

## 2018-06-25 DIAGNOSIS — R59.0 MEDIASTINAL LYMPHADENOPATHY: Primary | ICD-10-CM

## 2018-06-25 NOTE — TELEPHONE ENCOUNTER
[phone]    Pt called requesting clarification on why needs to come in on 6/29/18. He stated that he was told he needs to f/u following his last CT scan but he wants to know if he needed to have another scan prior to coming in. He stated that he [place] hour away and doesn't want to come in to just be told he needs another CT scan done.

## 2018-06-25 NOTE — TELEPHONE ENCOUNTER
I received today note from his cardiologist in [address] noting enlarged mediastinal LNs on his pre-ablation CT - 11/2/17. No f/u reporting to me re: this until now. Formal CT report not yet received. But, agree that the next step in the management would be to repeat the CT of the chest to reassess, clarify. So, I have re-ordered a CT and he can f/u afterward to review further rec's. So, cancel the 6/29/18 office visit. Please notify pt of rec's.

## 2018-06-26 NOTE — TELEPHONE ENCOUNTER
Spoke with patient after verifying name and  regarding Dr. Sylvain Whitlock recommendations. Writer informed patient of Dr. Sylvain Whitlock recommendations. Patient given an opportunity to ask questions, repeated information, and verbalized understanding.

## 2018-07-05 ENCOUNTER — HOSPITAL ENCOUNTER (OUTPATIENT)
Dept: CT IMAGING | Age: 53
Discharge: HOME | End: 2018-07-05
Attending: INTERNAL MEDICINE
Payer: COMMERCIAL

## 2018-07-05 DIAGNOSIS — R59.0 MEDIASTINAL LYMPHADENOPATHY: ICD-10-CM

## 2018-07-05 PROCEDURE — 74011250636 HC RX REV CODE- 250/636: Performed by: INTERNAL MEDICINE

## 2018-07-05 PROCEDURE — 71260 CT THORAX DX C+: CPT

## 2018-07-05 PROCEDURE — 74011636320 HC RX REV CODE- 636/320: Performed by: INTERNAL MEDICINE

## 2018-07-05 RX ORDER — SODIUM CHLORIDE 9 MG/ML
50 INJECTION, SOLUTION INTRAVENOUS
Status: COMPLETED | OUTPATIENT
Start: 2018-07-05 | End: 2018-07-05

## 2018-07-05 RX ORDER — SODIUM CHLORIDE 0.9 % (FLUSH) 0.9 %
10 SYRINGE (ML) INJECTION
Status: COMPLETED | OUTPATIENT
Start: 2018-07-05 | End: 2018-07-05

## 2018-07-05 RX ADMIN — Medication 10 ML: at 09:05

## 2018-07-05 RX ADMIN — SODIUM CHLORIDE 50 ML/HR: 900 INJECTION, SOLUTION INTRAVENOUS at 09:05

## 2018-07-05 RX ADMIN — IOPAMIDOL 80 ML: 755 INJECTION, SOLUTION INTRAVENOUS at 09:04

## 2018-07-06 NOTE — PROGRESS NOTES
Please notify pt of results    Chest CT did not show any chest lymph nodes which suggests that the nodes seen on the prior outside CT scan may have been reactive to an acute illness - now resolved. This CT shows a fatty liver and calcifications of the arteries of the heart - which had previously been identified. No new recommendations. Continue a low saturated fat, low carb, diabetic diet and continue current medications. He may follow up at his scheduled 10/31/18 visit.

## 2018-07-16 NOTE — PROGRESS NOTES
Spoke with patient after verifying name and  regarding Dr. Axel Kerr recommendations. Writer informed patient of Dr. Axel Kerr recommendations. Patient given an opportunity to ask questions, repeated information, and verbalized understanding.

## 2018-08-26 RX ORDER — ALLOPURINOL 100 MG/1
TABLET ORAL
Qty: 90 TAB | Refills: 1 | Status: SHIPPED | OUTPATIENT
Start: 2018-08-26 | End: 2019-02-18 | Stop reason: SDUPTHER

## 2018-09-10 DIAGNOSIS — E78.5 HYPERLIPIDEMIA, UNSPECIFIED HYPERLIPIDEMIA TYPE: ICD-10-CM

## 2018-09-10 RX ORDER — ROSUVASTATIN CALCIUM 20 MG/1
TABLET, COATED ORAL
Qty: 90 TAB | Refills: 1 | Status: SHIPPED | OUTPATIENT
Start: 2018-09-10 | End: 2019-03-10 | Stop reason: SDUPTHER

## 2018-10-08 RX ORDER — LISINOPRIL 40 MG/1
TABLET ORAL
Qty: 90 TAB | Refills: 1 | Status: SHIPPED | OUTPATIENT
Start: 2018-10-08 | End: 2019-04-07 | Stop reason: SDUPTHER

## 2018-10-31 ENCOUNTER — OFFICE VISIT (OUTPATIENT)
Dept: INTERNAL MEDICINE CLINIC | Age: 53
End: 2018-10-31

## 2018-10-31 VITALS
SYSTOLIC BLOOD PRESSURE: 117 MMHG | BODY MASS INDEX: 35.42 KG/M2 | OXYGEN SATURATION: 95 % | HEART RATE: 57 BPM | DIASTOLIC BLOOD PRESSURE: 72 MMHG | WEIGHT: 253 LBS | RESPIRATION RATE: 16 BRPM | TEMPERATURE: 98.1 F | HEIGHT: 71 IN

## 2018-10-31 DIAGNOSIS — E11.21 TYPE 2 DIABETES WITH NEPHROPATHY (HCC): Primary | ICD-10-CM

## 2018-10-31 DIAGNOSIS — I48.91 ATRIAL FIBRILLATION, UNSPECIFIED TYPE (HCC): ICD-10-CM

## 2018-10-31 DIAGNOSIS — E78.5 HYPERLIPIDEMIA, UNSPECIFIED HYPERLIPIDEMIA TYPE: ICD-10-CM

## 2018-10-31 DIAGNOSIS — M10.9 GOUT, UNSPECIFIED CAUSE, UNSPECIFIED CHRONICITY, UNSPECIFIED SITE: ICD-10-CM

## 2018-10-31 DIAGNOSIS — I10 ESSENTIAL HYPERTENSION: ICD-10-CM

## 2018-10-31 LAB — HBA1C MFR BLD HPLC: 6.1 %

## 2018-10-31 NOTE — LETTER
10/31/2018 Mr. Drake Dockery 
[address] [facility] [id number] Dear Drake Dockery: Please find your most recent results below. Resulted Orders AMB POC HEMOGLOBIN A1C Result Value Ref Range Hemoglobin A1c (POC) 6.1 At goal of < 6.5, but increased slightly RECOMMENDATIONS: 
 
 Keep up the good work! Work on diet and exercise. Continue with current  medications. Please call me if you have any questions: [phone] Sincerely, Marylou Vargas MD

## 2018-10-31 NOTE — PROGRESS NOTES
Rm#[id number]  Non fasting   Chief Complaint   Patient presents with    Hypertension     6 month f/u     Gout     6 month f/u     Diabetes     1. Have you been to the ER, urgent care clinic since your last visit? Hospitalized since your last visit? No    2. Have you seen or consulted any other health care providers outside of the [address] [place] [address] since your last visit? Include any pap smears or colon screening.  Yes cardio 9-2018   Health Maintenance Due   Topic Date Due    EYE EXAM RETINAL OR DILATED Q1  05/05/2016    Shingrix Vaccine Age 50> (1 of 2) 12/10/2017    Influenza Age 5 to Adult  08/01/2018    HEMOGLOBIN A1C Q6M  10/26/2018    MICROALBUMIN Q1  10/26/2018   refused flu vaccine

## 2018-10-31 NOTE — PROGRESS NOTES
HPI:  Presents for f/u gout, DM, HTN, etc    No gouty flares     +med compliance and tolerance  No orthostasis  No myalgia    appt for eye exam in a few weeks    No CP, SOB, neuro sx      Past medical, Social, and Family history reviewed    Prior to Admission medications    Medication Sig Start Date End Date Taking? Authorizing Provider   lisinopril (PRINIVIL, ZESTRIL) 40 mg tablet TAKE 1 TABLET BY MOUTH DAILY 10/8/18  Yes Violet Bettencourt MD   rosuvastatin (CRESTOR) 20 mg tablet TAKE 1 TABLET BY MOUTH NIGHTLY. 9/10/18  Yes Violet Bettencourt MD   allopurinol (ZYLOPRIM) 100 mg tablet TAKE 1 TAB BY MOUTH DAILY. 8/26/18  Yes Violet Bettencourt MD   metFORMIN (GLUCOPHAGE) 1,000 mg tablet TAKE 1 TABLET BY MOUTH 2 TIMES DAILY WITH MEALS 5/7/18  Yes Violet Bettencourt MD   metoprolol succinate (TOPROL-XL) 50 mg XL tablet Take 1 Tab by mouth daily. Indications: should be 50 mg per pt 4/26/18  Yes Violet Bettencourt MD   colchicine 0.6 mg tablet Take 1 Tab by mouth two (2) times a day. X 1 month then prn 2/23/18  Yes Violet Bettencourt MD   amLODIPine (NORVASC) 5 mg tablet Take 1 Tab by mouth daily. 2/23/18  Yes Violet Bettencourt MD   furosemide (LASIX) 20 mg tablet Take 1 Tab by mouth daily. 2/23/18   Violet Bettencourt MD   pantoprazole (PROTONIX) 40 mg tablet  11/6/17   Provider, Historical   triamcinolone acetonide (KENALOG) 0.1 % topical cream Apply  to affected area two (2) times a day. use thin layer 12/12/14   Violet Bettencourt MD          ROS  Complete ROS reviewed and negative or stable except as noted in HPI. Physical Exam   Constitutional: He is oriented to person, place, and time. He appears well-nourished. No distress. HENT:   Head: Normocephalic and atraumatic. Mouth/Throat: Oropharynx is clear and moist. No oropharyngeal exudate. Eyes: EOM are normal. Pupils are equal, round, and reactive to light. No scleral icterus. Neck: Normal range of motion. Neck supple. No JVD present.  No thyromegaly present. Cardiovascular: Normal rate, regular rhythm and normal heart sounds. Exam reveals no gallop and no friction rub. No murmur heard. Pulmonary/Chest: Effort normal and breath sounds normal. No respiratory distress. He has no wheezes. He has no rales. Abdominal: Soft. Bowel sounds are normal. He exhibits no distension. There is no tenderness. Musculoskeletal: Normal range of motion. He exhibits no edema. Lymphadenopathy:     He has no cervical adenopathy. Neurological: He is alert and oriented to person, place, and time. He exhibits normal muscle tone. Coordination normal.   Skin: Skin is warm. No rash noted. Psychiatric: He has a normal mood and affect. Nursing note and vitals reviewed. Prior labs reviewed. Assessment/Plan:    ICD-10-CM ICD-9-CM    1. Type 2 diabetes with nephropathy (HCC) E11.21 250.40 AMB POC URINE, MICROALBUMIN, SEMIQUANT (3 RESULTS)     583.81 AMB POC HEMOGLOBIN A1C       DIABETES FOOT EXAM   2. Atrial fibrillation, unspecified type ([facility] I48.91 427.31    3. Gout, unspecified cause, unspecified chronicity, unspecified site M10.9 274.9    4. Essential hypertension I10 401.9    5. Hyperlipidemia, unspecified hyperlipidemia type E78.5 272.4      Follow-up Disposition:  Return in about 6 months (around 4/30/2019), or if symptoms worsen or fail to improve, for diabetes, cholesterol, blood pressure.    results and schedule of future studies reviewed with patient  reviewed diet, exercise and weight  cardiovascular risk and specific lipid/LDL goals reviewed  reviewed medications and side effects in detail  Continue current medications

## 2018-11-04 RX ORDER — METOPROLOL SUCCINATE 50 MG/1
TABLET, EXTENDED RELEASE ORAL
Qty: 90 TAB | Refills: 1 | Status: SHIPPED | OUTPATIENT
Start: 2018-11-04 | End: 2021-04-05

## 2018-11-06 RX ORDER — METFORMIN HYDROCHLORIDE 1000 MG/1
TABLET ORAL
Qty: 180 TAB | Refills: 1 | Status: SHIPPED | OUTPATIENT
Start: 2018-11-06 | End: 2019-05-05 | Stop reason: SDUPTHER

## 2019-02-18 NOTE — TELEPHONE ENCOUNTER
Medication refill request:    Last Office Visit: 10/31/18  Next Office Visit:  No future appointments. Pharmacy verified. Yes    [facility] is requesting a 90 day supply.

## 2019-02-19 RX ORDER — ALLOPURINOL 100 MG/1
TABLET ORAL
Qty: 90 TAB | Refills: 1 | Status: SHIPPED | OUTPATIENT
Start: 2019-02-19 | End: 2019-08-26 | Stop reason: SDUPTHER

## 2019-03-10 DIAGNOSIS — E78.5 HYPERLIPIDEMIA, UNSPECIFIED HYPERLIPIDEMIA TYPE: ICD-10-CM

## 2019-03-10 RX ORDER — ROSUVASTATIN CALCIUM 20 MG/1
TABLET, COATED ORAL
Qty: 90 TAB | Refills: 1 | Status: SHIPPED | OUTPATIENT
Start: 2019-03-10 | End: 2019-10-07 | Stop reason: SDUPTHER

## 2019-04-09 RX ORDER — LISINOPRIL 40 MG/1
TABLET ORAL
Qty: 90 TAB | Refills: 1 | Status: SHIPPED | OUTPATIENT
Start: 2019-04-09 | End: 2019-09-27 | Stop reason: SDUPTHER

## 2019-05-05 RX ORDER — METFORMIN HYDROCHLORIDE 1000 MG/1
TABLET ORAL
Qty: 180 TAB | Refills: 1 | Status: SHIPPED | OUTPATIENT
Start: 2019-05-05 | End: 2019-11-06 | Stop reason: SDUPTHER

## 2019-08-01 ENCOUNTER — OFFICE VISIT (OUTPATIENT)
Dept: INTERNAL MEDICINE CLINIC | Age: 54
End: 2019-08-01

## 2019-08-01 VITALS
DIASTOLIC BLOOD PRESSURE: 82 MMHG | WEIGHT: 252.5 LBS | SYSTOLIC BLOOD PRESSURE: 129 MMHG | HEIGHT: 71 IN | TEMPERATURE: 98.5 F | RESPIRATION RATE: 16 BRPM | BODY MASS INDEX: 35.35 KG/M2 | OXYGEN SATURATION: 97 % | HEART RATE: 53 BPM

## 2019-08-01 DIAGNOSIS — Z12.5 PROSTATE CANCER SCREENING: ICD-10-CM

## 2019-08-01 DIAGNOSIS — I10 ESSENTIAL HYPERTENSION: Primary | ICD-10-CM

## 2019-08-01 DIAGNOSIS — Z98.890 S/P ABLATION OF ATRIAL FIBRILLATION: ICD-10-CM

## 2019-08-01 DIAGNOSIS — E78.5 HYPERLIPIDEMIA, UNSPECIFIED HYPERLIPIDEMIA TYPE: ICD-10-CM

## 2019-08-01 DIAGNOSIS — M10.9 GOUT, UNSPECIFIED CAUSE, UNSPECIFIED CHRONICITY, UNSPECIFIED SITE: ICD-10-CM

## 2019-08-01 DIAGNOSIS — E66.01 SEVERE OBESITY (BMI 35.0-39.9) WITH COMORBIDITY (HCC): ICD-10-CM

## 2019-08-01 DIAGNOSIS — Z86.79 S/P ABLATION OF ATRIAL FIBRILLATION: ICD-10-CM

## 2019-08-01 DIAGNOSIS — E11.9 TYPE 2 DIABETES MELLITUS WITHOUT COMPLICATION, WITHOUT LONG-TERM CURRENT USE OF INSULIN (HCC): ICD-10-CM

## 2019-08-01 DIAGNOSIS — I48.91 ATRIAL FIBRILLATION, UNSPECIFIED TYPE (HCC): ICD-10-CM

## 2019-08-01 PROBLEM — E11.21 TYPE 2 DIABETES WITH NEPHROPATHY (HCC): Status: RESOLVED | Noted: 2018-04-26 | Resolved: 2019-08-01

## 2019-08-01 NOTE — PROGRESS NOTES
HPI:  Presents for f/u DM, HTN, lipids, etc    Pt seen at [facility] for lump on chest   Took clinda for a few days  N/V kept him from finishing it. But, it is resolved now. Fasting for labs    No gouty flares    No CP, SOB, neuro sx    Had a 2nd Afib ablation in January    Pt denies snoring or daytime somnolence    Past medical, Social, and Family history reviewed    Prior to Admission medications    Medication Sig Start Date End Date Taking? Authorizing Provider   metFORMIN (GLUCOPHAGE) 1,000 mg tablet TAKE 1 TABLET BY MOUTH 2 TIMES DAILY WITH MEALS 5/5/19  Yes Violet Bettencourt MD   lisinopril (PRINIVIL, ZESTRIL) 40 mg tablet TAKE 1 TABLET BY MOUTH DAILY 4/9/19  Yes Violet Bettencourt MD   rosuvastatin (CRESTOR) 20 mg tablet TAKE 1 TABLET BY MOUTH NIGHTLY. 3/10/19  Yes Violet Bettencourt MD   allopurinol (ZYLOPRIM) 100 mg tablet TAKE 1 TAB BY MOUTH DAILY. 2/19/19  Yes Violet Bettencourt MD   metoprolol succinate (TOPROL-XL) 50 mg XL tablet TAKE 1 TABLET BY MOUTH DAILY 11/4/18  Yes Violet Bettencourt MD   colchicine 0.6 mg tablet Take 1 Tab by mouth two (2) times a day. X 1 month then prn 2/23/18  Yes Violet Bettencourt MD   amLODIPine (NORVASC) 5 mg tablet Take 1 Tab by mouth daily. 2/23/18  Yes Violet Bettencourt MD   rivaroxaban (XARELTO) 20 mg tab tablet Xarelto 20 mg tablet    Provider, Historical   furosemide (LASIX) 20 mg tablet Take 1 Tab by mouth daily. 5/15/19   Violet Bettencourt MD   triamcinolone acetonide (KENALOG) 0.1 % topical cream Apply  to affected area two (2) times a day. use thin layer 12/12/14   Violet Bettencourt MD          ROS  Complete ROS reviewed and negative or stable except as noted in HPI. Physical Exam   Constitutional: He is oriented to person, place, and time. He appears well-nourished. No distress. HENT:   Head: Normocephalic and atraumatic. Mouth/Throat: Oropharynx is clear and moist. No oropharyngeal exudate. Eyes: Pupils are equal, round, and reactive to light.  EOM are normal. No scleral icterus. Neck: Normal range of motion. Neck supple. No JVD present. No thyromegaly present. Cardiovascular: Normal rate, regular rhythm and normal heart sounds. Exam reveals no gallop and no friction rub. No murmur heard. Pulmonary/Chest: Effort normal and breath sounds normal. No respiratory distress. He has no wheezes. He has no rales. Abdominal: Soft. Bowel sounds are normal. He exhibits no distension. There is no tenderness. Musculoskeletal: Normal range of motion. He exhibits no edema. Lymphadenopathy:     He has no cervical adenopathy. Neurological: He is alert and oriented to person, place, and time. He exhibits normal muscle tone. Coordination normal.   Skin: Skin is warm. No rash noted. Psychiatric: He has a normal mood and affect. Nursing note and vitals reviewed. Prior labs reviewed. Assessment/Plan:    ICD-10-CM ICD-9-CM    1. Essential hypertension I10 401.9 CBC WITH AUTOMATED DIFF   2. Severe obesity (BMI 35.0-39. 9) with comorbidity ([facility] E66.01 278.01    3. Atrial fibrillation, unspecified type ([facility] I48.91 427.31    4. Hyperlipidemia, unspecified hyperlipidemia type E78.5 272.4 CK      LIPID PANEL      METABOLIC PANEL, COMPREHENSIVE   5. Gout, unspecified cause, unspecified chronicity, unspecified site M10.9 274.9 URIC ACID   6. Type 2 diabetes mellitus without complication, without long-term current use of insulin (HCC) E11.9 250.00 AMB POC URINE, MICROALBUMIN, SEMIQUANT (3 RESULTS)      HEMOGLOBIN A1C WITH EAG      METABOLIC PANEL, COMPREHENSIVE   7. S/P ablation of atrial fibrillation Z98.890 V45.89     Z86.79     8. Prostate cancer screening Z12.5 V76.44 PSA, DIAGNOSTIC (PROSTATE SPECIFIC AG)     Follow-up and Dispositions    · Return in about 6 months (around 2/1/2020), or if symptoms worsen or fail to improve, for blood pressure, cholesterol, diabetes.        results and schedule of future studies reviewed with patient  reviewed diet, exercise and weight   cardiovascular risk and specific lipid/LDL goals reviewed  reviewed medications and side effects in detail   Fasting labs  See cards as scheduled  Continue current medications   Pt declines evaluation for ANNETTE today

## 2019-08-01 NOTE — PROGRESS NOTES
RM 14    PAtient fasting at this time. PAtient reports clindamycin from cellutlits caused him to be nauseous. Chief Complaint   Patient presents with    Medication Evaluation     follow up    Medication Refill     1. Have you been to the ER, urgent care clinic since your last visit? Hospitalized since your last visit? Yes Reason for visit: A couple months ago, UC, cellulitis     2. Have you seen or consulted any other health care providers outside of the [address] [place] [address] since your last visit? Include any pap smears or colon screening. No    Health Maintenance Due   Topic Date Due    Pneumococcal 0-64 years (1 of 1 - PPSV23) 12/10/1973    EYE EXAM RETINAL OR DILATED  05/05/2017    Shingrix Vaccine Age 50> (1 of 2) 12/10/2017    MICROALBUMIN Q1  10/26/2018    HEMOGLOBIN A1C Q6M  04/30/2019    LIPID PANEL Q1  06/11/2019    Influenza Age 9 to Adult  08/01/2019       3 most recent PHQ Screens 8/1/2019   Little interest or pleasure in doing things Not at all   Feeling down, depressed, irritable, or hopeless Not at all   Total Score PHQ 2 0     Abuse Screening Questionnaire 8/1/2019   Do you ever feel afraid of your partner? N   Are you in a relationship with someone who physically or mentally threatens you? N   Is it safe for you to go home?  Y         Learning Assessment 8/1/2019   PRIMARY LEARNER Patient   HIGHEST LEVEL OF EDUCATION - PRIMARY LEARNER  -   BARRIERS PRIMARY LEARNER NONE   PRIMARY LANGUAGE ENGLISH   LEARNER PREFERENCE PRIMARY READING   ANSWERED BY patient   RELATIONSHIP SELF

## 2019-08-02 LAB
ALBUMIN SERPL-MCNC: 4.5 G/DL (ref 3.5–5.5)
ALBUMIN/GLOB SERPL: 1.5 {RATIO} (ref 1.2–2.2)
ALP SERPL-CCNC: 53 IU/L (ref 39–117)
ALT SERPL-CCNC: 112 IU/L (ref 0–44)
AST SERPL-CCNC: 92 IU/L (ref 0–40)
BASOPHILS # BLD AUTO: 0.1 X10E3/UL (ref 0–0.2)
BASOPHILS NFR BLD AUTO: 1 %
BILIRUB SERPL-MCNC: 0.4 MG/DL (ref 0–1.2)
BUN SERPL-MCNC: 9 MG/DL (ref 6–24)
BUN/CREAT SERPL: 13 (ref 9–20)
CALCIUM SERPL-MCNC: 9.9 MG/DL (ref 8.7–10.2)
CHLORIDE SERPL-SCNC: 100 MMOL/L (ref 96–106)
CHOLEST SERPL-MCNC: 160 MG/DL (ref 100–199)
CK SERPL-CCNC: 62 U/L (ref 24–204)
CO2 SERPL-SCNC: 24 MMOL/L (ref 20–29)
CREAT SERPL-MCNC: 0.69 MG/DL (ref 0.76–1.27)
EOSINOPHIL # BLD AUTO: 0.1 X10E3/UL (ref 0–0.4)
EOSINOPHIL NFR BLD AUTO: 1 %
ERYTHROCYTE [DISTWIDTH] IN BLOOD BY AUTOMATED COUNT: 13.6 % (ref 12.3–15.4)
EST. AVERAGE GLUCOSE BLD GHB EST-MCNC: 140 MG/DL
GLOBULIN SER CALC-MCNC: 3 G/DL (ref 1.5–4.5)
GLUCOSE SERPL-MCNC: 132 MG/DL (ref 65–99)
HBA1C MFR BLD: 6.5 % (ref 4.8–5.6)
HCT VFR BLD AUTO: 44.2 % (ref 37.5–51)
HDLC SERPL-MCNC: 51 MG/DL
HGB BLD-MCNC: 15.1 G/DL (ref 13–17.7)
IMM GRANULOCYTES # BLD AUTO: 0 X10E3/UL (ref 0–0.1)
IMM GRANULOCYTES NFR BLD AUTO: 0 %
LDLC SERPL CALC-MCNC: 81 MG/DL (ref 0–99)
LYMPHOCYTES # BLD AUTO: 1.5 X10E3/UL (ref 0.7–3.1)
LYMPHOCYTES NFR BLD AUTO: 22 %
MCH RBC QN AUTO: 33.5 PG (ref 26.6–33)
MCHC RBC AUTO-ENTMCNC: 34.2 G/DL (ref 31.5–35.7)
MCV RBC AUTO: 98 FL (ref 79–97)
MONOCYTES # BLD AUTO: 0.6 X10E3/UL (ref 0.1–0.9)
MONOCYTES NFR BLD AUTO: 8 %
NEUTROPHILS # BLD AUTO: 4.7 X10E3/UL (ref 1.4–7)
NEUTROPHILS NFR BLD AUTO: 68 %
PLATELET # BLD AUTO: 232 X10E3/UL (ref 150–450)
POTASSIUM SERPL-SCNC: 4.6 MMOL/L (ref 3.5–5.2)
PROT SERPL-MCNC: 7.5 G/DL (ref 6–8.5)
PSA SERPL-MCNC: 0.5 NG/ML (ref 0–4)
RBC # BLD AUTO: 4.51 X10E6/UL (ref 4.14–5.8)
SODIUM SERPL-SCNC: 141 MMOL/L (ref 134–144)
TRIGL SERPL-MCNC: 139 MG/DL (ref 0–149)
URATE SERPL-MCNC: 5.3 MG/DL (ref 3.7–8.6)
VLDLC SERPL CALC-MCNC: 28 MG/DL (ref 5–40)
WBC # BLD AUTO: 6.9 X10E3/UL (ref 3.4–10.8)

## 2019-08-07 NOTE — PROGRESS NOTES
PSA - stable from year prior. Low risk for active prostate cacner    uric acid less than 6, at goal at this time. A1C: up from prior at 6.5 - This indicates your blood sugars have risen. continue metformin, try to cut back on carbohydrates to help improve blood sugars. Liver enzymes are also up from prior. - I recommend you  avoid all alcohol, aim for weight loss as noted above.    Lab letter generated  Sami Rosenbaum MD

## 2019-08-29 RX ORDER — ALLOPURINOL 100 MG/1
TABLET ORAL
Qty: 90 TAB | Refills: 1 | Status: SHIPPED | OUTPATIENT
Start: 2019-08-29 | End: 2020-02-19

## 2019-09-30 RX ORDER — LISINOPRIL 40 MG/1
TABLET ORAL
Qty: 90 TAB | Refills: 1 | Status: SHIPPED | OUTPATIENT
Start: 2019-09-30 | End: 2020-03-20

## 2019-10-07 DIAGNOSIS — E78.5 HYPERLIPIDEMIA, UNSPECIFIED HYPERLIPIDEMIA TYPE: ICD-10-CM

## 2019-10-07 RX ORDER — ROSUVASTATIN CALCIUM 20 MG/1
TABLET, COATED ORAL
Qty: 90 TAB | Refills: 1 | Status: SHIPPED | OUTPATIENT
Start: 2019-10-07 | End: 2020-03-18

## 2019-10-07 NOTE — TELEPHONE ENCOUNTER
Medication refill request:    Last Office Visit:  8/1/19  Next Office Visit:  No future appointments. Pharmacy verified. yes    Patient requesting 90 day  supply.

## 2019-11-06 RX ORDER — METFORMIN HYDROCHLORIDE 1000 MG/1
TABLET ORAL
Qty: 180 TAB | Refills: 1 | Status: SHIPPED | OUTPATIENT
Start: 2019-11-06 | End: 2020-04-27

## 2020-01-13 DIAGNOSIS — I10 ESSENTIAL HYPERTENSION: ICD-10-CM

## 2020-01-13 RX ORDER — FUROSEMIDE 20 MG/1
TABLET ORAL
Qty: 90 TAB | Refills: 1 | Status: SHIPPED | OUTPATIENT
Start: 2020-01-13 | End: 2020-07-28

## 2020-02-19 RX ORDER — ALLOPURINOL 100 MG/1
TABLET ORAL
Qty: 90 TAB | Refills: 1 | Status: SHIPPED | OUTPATIENT
Start: 2020-02-19 | End: 2020-07-31

## 2020-03-18 DIAGNOSIS — E78.5 HYPERLIPIDEMIA, UNSPECIFIED HYPERLIPIDEMIA TYPE: ICD-10-CM

## 2020-03-18 RX ORDER — ROSUVASTATIN CALCIUM 20 MG/1
TABLET, COATED ORAL
Qty: 90 TAB | Refills: 1 | Status: SHIPPED | OUTPATIENT
Start: 2020-03-18 | End: 2020-10-12

## 2020-03-20 RX ORDER — LISINOPRIL 40 MG/1
TABLET ORAL
Qty: 90 TAB | Refills: 1 | Status: SHIPPED | OUTPATIENT
Start: 2020-03-20 | End: 2020-09-25

## 2020-04-27 RX ORDER — METFORMIN HYDROCHLORIDE 1000 MG/1
TABLET ORAL
Qty: 180 TAB | Refills: 1 | Status: SHIPPED | OUTPATIENT
Start: 2020-04-27 | End: 2020-10-12

## 2020-07-28 DIAGNOSIS — I10 ESSENTIAL HYPERTENSION: ICD-10-CM

## 2020-07-28 RX ORDER — FUROSEMIDE 20 MG/1
TABLET ORAL
Qty: 90 TAB | Refills: 1 | Status: SHIPPED | OUTPATIENT
Start: 2020-07-28 | End: 2020-12-31

## 2020-09-08 ENCOUNTER — VIRTUAL VISIT (OUTPATIENT)
Dept: INTERNAL MEDICINE CLINIC | Age: 55
End: 2020-09-08
Payer: COMMERCIAL

## 2020-09-08 DIAGNOSIS — I48.91 ATRIAL FIBRILLATION, UNSPECIFIED TYPE (HCC): ICD-10-CM

## 2020-09-08 DIAGNOSIS — R79.89 ELEVATED LFTS: Primary | ICD-10-CM

## 2020-09-08 DIAGNOSIS — K76.0 HEPATIC STEATOSIS: ICD-10-CM

## 2020-09-08 DIAGNOSIS — E11.9 TYPE 2 DIABETES MELLITUS WITHOUT COMPLICATION, WITHOUT LONG-TERM CURRENT USE OF INSULIN (HCC): ICD-10-CM

## 2020-09-08 DIAGNOSIS — Z98.890 S/P ABLATION OF ATRIAL FIBRILLATION: ICD-10-CM

## 2020-09-08 DIAGNOSIS — I10 ESSENTIAL HYPERTENSION: ICD-10-CM

## 2020-09-08 DIAGNOSIS — Z78.9 ALCOHOL USE: ICD-10-CM

## 2020-09-08 DIAGNOSIS — Z86.79 S/P ABLATION OF ATRIAL FIBRILLATION: ICD-10-CM

## 2020-09-08 DIAGNOSIS — E11.21 TYPE 2 DIABETES WITH NEPHROPATHY (HCC): ICD-10-CM

## 2020-09-08 DIAGNOSIS — M10.9 GOUT, UNSPECIFIED CAUSE, UNSPECIFIED CHRONICITY, UNSPECIFIED SITE: ICD-10-CM

## 2020-09-08 DIAGNOSIS — E78.5 HYPERLIPIDEMIA, UNSPECIFIED HYPERLIPIDEMIA TYPE: ICD-10-CM

## 2020-09-08 PROCEDURE — 99442 PR PHYS/QHP TELEPHONE EVALUATION 11-20 MIN: CPT | Performed by: INTERNAL MEDICINE

## 2020-09-08 RX ORDER — LANOLIN ALCOHOL/MO/W.PET/CERES
1500 CREAM (GRAM) TOPICAL DAILY
COMMUNITY
Start: 2020-07-28

## 2020-09-08 RX ORDER — FLECAINIDE ACETATE 50 MG/1
50 TABLET ORAL 2 TIMES DAILY
COMMUNITY
Start: 2019-09-13 | End: 2021-09-20 | Stop reason: ALTCHOICE

## 2020-09-08 RX ORDER — CARVEDILOL 25 MG/1
25 TABLET ORAL 2 TIMES DAILY
COMMUNITY
Start: 2020-08-26 | End: 2021-09-20 | Stop reason: ALTCHOICE

## 2020-09-08 RX ORDER — FEBUXOSTAT 80 MG/1
80 TABLET, FILM COATED ORAL DAILY
Qty: 90 TAB | Refills: 1 | Status: SHIPPED | OUTPATIENT
Start: 2020-09-08 | End: 2021-04-05

## 2020-09-08 NOTE — PROGRESS NOTES
I was in the office while conducting this encounter. Consent:  He and/or his healthcare decision maker is aware that this patient-initiated Telehealth encounter is a billable service, with coverage as determined by his insurance carrier. He is aware that he may receive a bill and has provided verbal consent to proceed: Yes    This virtual visit was conducted telephone encounter only. -  I affirm this is a Patient Initiated Episode with an Established Patient who has not had a related appointment within my department in the past 7 days or scheduled within the next 24 hours. Note: this encounter is not billable if this call serves to triage the patient into an appointment for the relevant concern. Total Time: minutes: 11-20 minutes. Attempted audio-visual virtual visit, but device and/or connection would not support video. LFTs were elevated and cards sent to GI  GI rec'd to stop allopurinol and statin    abd US this am - results pending    Still drinking etoh a fair amount. Pt reports trying to cut back, though. Prior labs reviewed. Reviewed prior imaging reports      A/P -   I favor etoh and metabolic fatty liver dz as etiologies of elevated LFTs  Possible allopurinol or statin contributing    ICD-10-CM ICD-9-CM    1. Elevated LFTs  R79.89 790.6    2. Gout, unspecified cause, unspecified chronicity, unspecified site  M10.9 274.9 febuxostat (Uloric) 80 mg tab tablet   3. Hyperlipidemia, unspecified hyperlipidemia type  E78.5 272.4    4. Essential hypertension  I10 401.9    5. Type 2 diabetes mellitus without complication, without long-term current use of insulin (HCC)  E11.9 250.00    6. Type 2 diabetes with nephropathy (HCC)  E11.21 250.40      583.81    7. Atrial fibrillation, unspecified type ([facility]  I48.91 427.31    8. S/P ablation of atrial fibrillation  Z98.890 V45.89     Z86.79     9. Hepatic steatosis  K76.0 571.8    10.  Alcohol use  Z72.89 V49.89      Follow-up and Dispositions · Return in about 2 months (around 11/8/2020), or if symptoms worsen or fail to improve, for diabetes, cholesterol, gout - IO.        results and schedule of future studies reviewed with patient  reviewed diet, exercise and weight    cardiovascular risk and specific lipid/LDL goals reviewed  reviewed medications and side effects in detail     Await GI approval to resume statin - but not reasonable substitute  Try to get uloric approved  Dc allopurinol  See GI as scheduled  See Cards as scheduled  Plan for fasting labs along with PSA, uric acid, HgbA1C in 2-3 months once back on statin.   Plan for IO visit

## 2020-09-08 NOTE — PROGRESS NOTES
[phone]  Presents w/ wife   PAIN LEVEL 0     Chief Complaint   Patient presents with    Medication Evaluation     follow up      1. Have you been to the ER, urgent care clinic since your last visit? Hospitalized since your last visit? No    2. Have you seen or consulted any other health care providers outside of the [address] [place] [address] since your last visit? Include any pap smears or colon screening. Yes GI, Dr. Jamaal Millard, 9-3-20, fatty liver.   cardio VV, 8-2020, Dr. Radha Dove Maintenance Due   Topic Date Due    Pneumococcal 0-64 years (1 of 1 - PPSV23) 12/10/1973    Shingrix Vaccine Age 50> (1 of 2) 12/10/2017    MICROALBUMIN Q1  10/26/2018    Foot Exam Q1  10/31/2019    A1C test (Diabetic or Prediabetic)  08/01/2020    Lipid Screen  08/01/2020    Flu Vaccine (1) 09/01/2020     3 most recent PHQ Screens 9/8/2020   Little interest or pleasure in doing things Not at all   Feeling down, depressed, irritable, or hopeless More than half the days   Total Score PHQ 2 2     Recent Travel Screening and Travel History documentation     Travel Screening      No screening recorded since 09/07/20 0000      Travel History   Travel since 08/08/20     No documented travel since 08/08/20

## 2020-09-25 RX ORDER — LISINOPRIL 40 MG/1
TABLET ORAL
Qty: 90 TAB | Refills: 1 | Status: SHIPPED | OUTPATIENT
Start: 2020-09-25 | End: 2021-04-02

## 2020-10-11 DIAGNOSIS — E78.5 HYPERLIPIDEMIA, UNSPECIFIED HYPERLIPIDEMIA TYPE: ICD-10-CM

## 2020-10-12 RX ORDER — ROSUVASTATIN CALCIUM 20 MG/1
TABLET, COATED ORAL
Qty: 90 TAB | Refills: 1 | Status: SHIPPED | OUTPATIENT
Start: 2020-10-12 | End: 2021-09-20 | Stop reason: SDUPTHER

## 2020-10-12 RX ORDER — METFORMIN HYDROCHLORIDE 1000 MG/1
TABLET ORAL
Qty: 180 TAB | Refills: 1 | Status: SHIPPED | OUTPATIENT
Start: 2020-10-12 | End: 2021-04-01

## 2020-12-31 DIAGNOSIS — I10 ESSENTIAL HYPERTENSION: ICD-10-CM

## 2020-12-31 RX ORDER — FUROSEMIDE 20 MG/1
TABLET ORAL
Qty: 90 TAB | Refills: 1 | Status: SHIPPED | OUTPATIENT
Start: 2020-12-31 | End: 2021-06-28

## 2021-04-01 RX ORDER — METFORMIN HYDROCHLORIDE 1000 MG/1
TABLET ORAL
Qty: 180 TAB | Refills: 1 | Status: SHIPPED | OUTPATIENT
Start: 2021-04-01 | End: 2021-09-29

## 2021-04-02 RX ORDER — LISINOPRIL 40 MG/1
TABLET ORAL
Qty: 90 TAB | Refills: 1 | Status: SHIPPED | OUTPATIENT
Start: 2021-04-02

## 2021-04-05 ENCOUNTER — VIRTUAL VISIT (OUTPATIENT)
Dept: INTERNAL MEDICINE CLINIC | Age: 56
End: 2021-04-05
Payer: COMMERCIAL

## 2021-04-05 DIAGNOSIS — R42 POSTURAL DIZZINESS WITH PRESYNCOPE: Primary | ICD-10-CM

## 2021-04-05 DIAGNOSIS — E83.110 HEREDITARY HEMOCHROMATOSIS (HCC): ICD-10-CM

## 2021-04-05 DIAGNOSIS — R55 POSTURAL DIZZINESS WITH PRESYNCOPE: Primary | ICD-10-CM

## 2021-04-05 DIAGNOSIS — E11.9 TYPE 2 DIABETES MELLITUS WITHOUT COMPLICATION, WITHOUT LONG-TERM CURRENT USE OF INSULIN (HCC): ICD-10-CM

## 2021-04-05 DIAGNOSIS — M10.9 GOUT, UNSPECIFIED CAUSE, UNSPECIFIED CHRONICITY, UNSPECIFIED SITE: ICD-10-CM

## 2021-04-05 PROCEDURE — 99214 OFFICE O/P EST MOD 30 MIN: CPT | Performed by: INTERNAL MEDICINE

## 2021-04-05 NOTE — PATIENT INSTRUCTIONS
Orthostatic Hypotension: Care Instructions Your Care Instructions Orthostatic hypotension is a quick drop in blood pressure. It happens when you get up from sitting or lying down. You may feel faint, lightheaded, or dizzy. When a person sits up or stands up, the body changes the way it pumps blood. This can slow the flow of blood to the brain for a very short time. And that can make you feel lightheaded. Many medicines can cause this problem, especially in older people. Lack of fluids (dehydration) or illnesses such as diabetes or heart disease also can cause it. Follow-up care is a key part of your treatment and safety. Be sure to make and go to all appointments, and call your doctor if you are having problems. It's also a good idea to know your test results and keep a list of the medicines you take. How can you care for yourself at home? · Be safe with medicines. Call your doctor if you think you are having a problem with your medicine. You will get more details on the specific medicines your doctor prescribes. · If you feel dizzy or lightheaded, sit down or lie down for a few minutes. Or you can sit down and put your head between your knees. This will help your blood pressure go back to normal and help your symptoms go away. · Follow your doctor's suggestions for ways to prevent symptoms like dizziness. These suggestions may include: ? Get up slowly from bed or after sitting for a long time. If you are in bed, roll to your side and swing your legs over the edge of the bed and onto the floor. Push your body up to a sitting position. Wait for a while before you slowly stand up. 
? Add more salt to your diet, if your doctor recommends it. ? Drink plenty of fluids. Choose water and other caffeine-free clear liquids. If you have kidney, heart, or liver disease and have to limit fluids, talk with your doctor before you increase the amount of fluids you drink. ?  Avoid or limit alcohol to 2 drinks a day for men and 1 drink a day for women. Alcohol may interfere with your medicine. In addition, alcohol can make your low blood pressure worse by causing your body to lose water. ? Avoid or limit caffeine. Caffeine can cause your body to lose water. ? Wear compression stockings to help improve blood flow. When should you call for help? Call 911 anytime you think you may need emergency care. For example, call if: 
  · You passed out (lost consciousness). Watch closely for changes in your health, and be sure to contact your doctor if: 
  · You do not get better as expected. Where can you learn more? Go to http://www.gray.com/ Enter F426 in the search box to learn more about \"Orthostatic Hypotension: Care Instructions. \" Current as of: August 31, 2020               Content Version: 12.8 © 7338-0770 [employer]. Care instructions adapted under license by [employer] (which disclaims liability or warranty for this information). If you have questions about a medical condition or this instruction, always ask your healthcare professional. [place] [address] any warranty or liability for your use of this information.

## 2021-04-05 NOTE — PROGRESS NOTES
VIRTUAL VISIT    Chief Complaint   Patient presents with   [place] Righter Sensitivity     pt sttaes that his vision isn't blurred today and that bp was low when checked.  Blurred Vision        Recent Travel Screening and Travel History documentation     Travel Screening      No screening recorded since 04/04/21 0000      Travel History   Travel since 03/05/21     No documented travel since 03/05/21        Abuse Screening Questionnaire 8/1/2019   Do you ever feel afraid of your partner? N   Are you in a relationship with someone who physically or mentally threatens you? N   Is it safe for you to go home? Y            1. Have you been to the ER, urgent care clinic since your last visit? Hospitalized since your last visit? No    2. Have you seen or consulted any other health care providers outside of the [facility] since your last visit? Include any pap smears or colon screening.  No     Health Maintenance Due   Topic Date Due    Pneumococcal 0-64 years (1 of 1 - PPSV23) Never done    COVID-19 Vaccine (1) Never done    Shingrix Vaccine Age 50> (1 of 2) Never done    MICROALBUMIN Q1  10/26/2018    Foot Exam Q1  10/31/2019    A1C test (Diabetic or Prediabetic)  08/01/2020    Lipid Screen  08/01/2020        Learning Assessment 8/1/2019   PRIMARY LEARNER Patient   HIGHEST LEVEL OF EDUCATION - PRIMARY LEARNER  -   BARRIERS PRIMARY LEARNER NONE   PRIMARY LANGUAGE ENGLISH   LEARNER PREFERENCE PRIMARY READING   ANSWERED BY patient   RELATIONSHIP SELF

## 2021-04-05 NOTE — PROGRESS NOTES
Kulwant Angeles is a 50 y.o. male who was seen by synchronous (real-time) audio-video technology on 4/5/2021. Consent: Kulwant Angeles, who was seen by synchronous (real-time) audio-video technology, and/or his healthcare decision maker, is aware that this patient-initiated, Telehealth encounter on 4/5/2021 is a billable service, with coverage as determined by his insurance carrier. He is aware that he may receive a bill and has provided verbal consent to proceed: Yes. Assessment & Plan:   Diagnoses and all orders for this visit:    1. Postural dizziness with presyncope  Comments:  onset during yardwork - consistent with overheating or mild dehydration in patient with BB and diuretic. improving 2 days out. - BMP, if ongoing to see cards. Orders:  -     METABOLIC PANEL, BASIC    2. Type 2 diabetes mellitus without complication, without long-term current use of insulin (HCC)  Comments:  A1C requested. patient overdue for cosme offce follow-up suggested scheduling within next 2 months. Orders:  -     HEMOGLOBIN A1C WITH EAG    3. Hereditary hemochromatosis ([facility] [address]  Comments:  new diagnosis. following with liver specialist at [facility], [facility]. -     4. Gout, unspecified cause, unspecified chronicity, unspecified site  Comments:  ulloric on hold due to liver disease - will get uric acid level. No recent gout flares. Orders:  -     URIC ACID    postural dizziness - consistent with orthostatic/BP mediated and not vertigo. Scenario most likely mild dehydration in setting of BB and diuretic. History of afib, but HR in normal range on check. Patient also with hemochromatosis new diagnosis and possible thyroid dysfunction. Check kidney and thyroid function. If ongoing discussed touching base with cardiology, though improved, and no other symptoms concerning for new heart failure. Encouarged patient to come in for general well visit and catch up with Dr. Emma Stephens after new diagnosis as well as diabetes management. Has started covid vaccine. (Please also see details in patient instructions)  Subjective:   Tara Steve is a 50 y.o. male who was seen for Light Sensitivity (pt sttaes that his vision isn't blurred today and that bp was low when checked.) and Blurred Vision    50year old man. Affected by atrial fibrillation  Reports recent diagnosis with fatty liver. Also found to have high iron in the blood. Diagnosed with hemochromatosis   - advised to stop statin. - is on low iron diet. - if he stands up fast or sitting down for a while will get lightheaded. - started having blurred vision on Saturday while doing some yard work. - was not hot. - did feel lightheaded. - Head rush feeling. Not spinning, but a head rush feeling.   - it did remind him of when he was in Afib but \"that is not the issue\". - checked blood pressure. 115/57 - pulse 62    This morning 124/66 - took blood pressure medication this morning. Has not skipped any doses of medication. No new ankle swelling. No PND  No exertional dyspnea. Prior to Admission medications    Medication Sig Start Date End Date Taking? Authorizing Provider   lisinopriL (PRINIVIL, ZESTRIL) 40 mg tablet TAKE 1 TABLET BY MOUTH EVERY DAY 4/2/21  Yes Jazmine Mason NP   metFORMIN (GLUCOPHAGE) 1,000 mg tablet TAKE 1 TABLET BY MOUTH TWICE A DAY WITH MEALS 4/1/21  Yes Koko Torres MD   furosemide (LASIX) 20 mg tablet TAKE 1 TABLET BY MOUTH EVERY DAY 12/31/20  Yes Koko Torres MD   flecainide (TAMBOCOR) 50 mg tablet Take 50 mg by mouth two (2) times a day. 9/13/19  Yes Provider, Historical   carvediloL (COREG) 25 mg tablet Take 25 mg by mouth two (2) times a day. 8/26/20  Yes Provider, Historical   rivaroxaban (XARELTO) 20 mg tab tablet Xarelto 20 mg tablet   Yes Provider, Historical   amLODIPine (NORVASC) 5 mg tablet Take 1 Tab by mouth daily.  2/23/18  Yes Koko Torres MD   rosuvastatin (CRESTOR) 20 mg tablet TAKE 1 TABLET BY MOUTH EVERY DAY AT NIGHT 10/12/20   Emmanuel Dejesus MD   magnesium oxide (MAG-OX) 400 mg tablet Take 1,500 mg by mouth daily. 7/28/20   Provider, Historical   febuxostat (Uloric) 80 mg tab tablet Take 1 Tab by mouth daily. 9/8/20   Emmanuel Dejesus MD   metoprolol succinate (TOPROL-XL) 50 mg XL tablet TAKE 1 TABLET BY MOUTH DAILY 11/4/18   Emmanuel Dejesus MD   colchicine 0.6 mg tablet Take 1 Tab by mouth two (2) times a day. X 1 month then prn 2/23/18   Emmanuel Dejesus MD   triamcinolone acetonide (KENALOG) 0.1 % topical cream Apply  to affected area two (2) times a day. use thin layer 12/12/14   Emmanuel Dejesus MD     Allergies   Allergen Reactions    Clindamycin Nausea and Vomiting    Fiorinal [Butalbital-Aspirin-Caffeine] Unknown (comments)     Throat swelling     Review of Systems   Constitutional: Negative for chills, fever and malaise/fatigue. HENT: Negative for congestion and sore throat. Respiratory: Negative for cough and shortness of breath. Cardiovascular: Negative for chest pain, palpitations, orthopnea, leg swelling and PND. Gastrointestinal: Negative for abdominal pain and nausea. Skin: Negative for rash. Objective:   Vital Signs: (As obtained by patient/caregiver at home)  There were no vitals taken for this visit.      PHYSICAL EXAMINATION:    Constitutional: [x] Appears well-developed and well-nourished [x] No apparent distress      Mental status: [x] Alert and awake  [x] Oriented to person/place/time [x] Able to follow commands      Eyes:   EOM    [x]  Normal      Sclera  [x]  Normal              Discharge [x]  None visible       HENT: [x] Normocephalic, atraumatic    [x] Mouth/Throat: Mucous membranes are moist    External Ears [x] Normal      Neck: [x] No visualized mass     Pulmonary/Chest: [x] Respiratory effort normal   [x] No visualized signs of difficulty breathing or respiratory distress         Musculoskeletal:      [x] Normal range of motion of neck Neurological:        [x] No Facial Asymmetry (Cranial nerve 7 motor function) (limited exam due to video visit)          [x] No gaze palsy              Skin:        [x] No significant exanthematous lesions or discoloration noted on facial skin              Psychiatric:       [x] Normal Affect        [x] Normal behavior    Other pertinent observable physical exam findings:- none      We discussed the expected course, resolution and complications of the diagnosis(es) in detail. Medication risks, benefits, costs, interactions, and alternatives were discussed as indicated. I advised him to contact the office if his condition worsens, changes or fails to improve as anticipated. He expressed understanding with the diagnosis(es) and plan. Lou Larson is a 50 y.o. male who was evaluated by a video visit encounter for concerns as above. Patient identification was verified prior to start of the visit. A caregiver was present when appropriate. Due to this being a TeleHealth encounter (During [facility] public [facility] emergency), evaluation of the following organ systems was limited: Vitals/Constitutional/EENT/Resp/CV/GI//MS/Neuro/Skin/Heme-Lymph-Imm. Pursuant to the emergency declaration under the [facility][note 3][facility], [facility] waiver authority and the [employer] Act, this Virtual  Visit was conducted, with patient's (and/or legal guardian's) consent, to reduce the patient's risk of exposure to COVID-19 and provide necessary medical care. Services were provided through a video synchronous discussion virtually to substitute for in-person clinic visit. Patient was located at their home. Provider was in office.        Jacob Buck MD

## 2021-06-28 DIAGNOSIS — I10 ESSENTIAL HYPERTENSION: ICD-10-CM

## 2021-06-28 RX ORDER — FUROSEMIDE 20 MG/1
TABLET ORAL
Qty: 90 TABLET | Refills: 1 | Status: SHIPPED | OUTPATIENT
Start: 2021-06-28 | End: 2021-12-31

## 2021-09-20 ENCOUNTER — OFFICE VISIT (OUTPATIENT)
Dept: INTERNAL MEDICINE CLINIC | Age: 56
End: 2021-09-20
Payer: COMMERCIAL

## 2021-09-20 VITALS
TEMPERATURE: 98.5 F | SYSTOLIC BLOOD PRESSURE: 138 MMHG | HEART RATE: 60 BPM | WEIGHT: 255.13 LBS | HEIGHT: 71 IN | OXYGEN SATURATION: 96 % | BODY MASS INDEX: 35.72 KG/M2 | RESPIRATION RATE: 18 BRPM | DIASTOLIC BLOOD PRESSURE: 78 MMHG

## 2021-09-20 DIAGNOSIS — Z12.5 PROSTATE CANCER SCREENING: ICD-10-CM

## 2021-09-20 DIAGNOSIS — M10.9 GOUT, UNSPECIFIED CAUSE, UNSPECIFIED CHRONICITY, UNSPECIFIED SITE: ICD-10-CM

## 2021-09-20 DIAGNOSIS — Z23 ENCOUNTER FOR IMMUNIZATION: ICD-10-CM

## 2021-09-20 DIAGNOSIS — I48.91 ATRIAL FIBRILLATION, UNSPECIFIED TYPE (HCC): ICD-10-CM

## 2021-09-20 DIAGNOSIS — E11.9 TYPE 2 DIABETES MELLITUS WITHOUT COMPLICATION, WITHOUT LONG-TERM CURRENT USE OF INSULIN (HCC): Primary | ICD-10-CM

## 2021-09-20 DIAGNOSIS — E55.9 VITAMIN D DEFICIENCY: ICD-10-CM

## 2021-09-20 DIAGNOSIS — I10 ESSENTIAL HYPERTENSION: ICD-10-CM

## 2021-09-20 DIAGNOSIS — K76.0 HEPATIC STEATOSIS: ICD-10-CM

## 2021-09-20 DIAGNOSIS — Z78.9 ALCOHOL USE: ICD-10-CM

## 2021-09-20 DIAGNOSIS — R79.89 ELEVATED LFTS: ICD-10-CM

## 2021-09-20 DIAGNOSIS — E78.5 HYPERLIPIDEMIA, UNSPECIFIED HYPERLIPIDEMIA TYPE: ICD-10-CM

## 2021-09-20 LAB
ALBUMIN UR QL STRIP: 10 MG/L
CREATININE, URINE POC: 100 MG/DL
HBA1C MFR BLD HPLC: 7.2 %
MICROALBUMIN/CREAT RATIO POC: <30 MG/G

## 2021-09-20 PROCEDURE — 90750 HZV VACC RECOMBINANT IM: CPT | Performed by: INTERNAL MEDICINE

## 2021-09-20 PROCEDURE — 90471 IMMUNIZATION ADMIN: CPT | Performed by: INTERNAL MEDICINE

## 2021-09-20 PROCEDURE — 3051F HG A1C>EQUAL 7.0%<8.0%: CPT | Performed by: INTERNAL MEDICINE

## 2021-09-20 PROCEDURE — 82044 UR ALBUMIN SEMIQUANTITATIVE: CPT | Performed by: INTERNAL MEDICINE

## 2021-09-20 PROCEDURE — 83036 HEMOGLOBIN GLYCOSYLATED A1C: CPT | Performed by: INTERNAL MEDICINE

## 2021-09-20 PROCEDURE — 99214 OFFICE O/P EST MOD 30 MIN: CPT | Performed by: INTERNAL MEDICINE

## 2021-09-20 RX ORDER — ROSUVASTATIN CALCIUM 20 MG/1
20 TABLET, COATED ORAL
Qty: 90 TABLET | Refills: 1 | Status: SHIPPED | OUTPATIENT
Start: 2021-09-20

## 2021-09-20 RX ORDER — SOTALOL HYDROCHLORIDE 80 MG/1
80 TABLET ORAL 2 TIMES DAILY
COMMUNITY
Start: 2021-08-30

## 2021-09-20 RX ORDER — ALLOPURINOL 100 MG/1
100 TABLET ORAL
COMMUNITY
End: 2021-09-20 | Stop reason: SINTOL

## 2021-09-20 RX ORDER — FEBUXOSTAT 80 MG/1
80 TABLET, FILM COATED ORAL DAILY
Qty: 30 TABLET | Refills: 5 | Status: SHIPPED | OUTPATIENT
Start: 2021-09-20

## 2021-09-20 NOTE — PROGRESS NOTES
RM: 14  Patient is not fasting     Chief Complaint   Patient presents with    Follow-up    Labs      Visit Vitals  BP (!) 150/78 (BP 1 Location: Left upper arm, BP Patient Position: Sitting, BP Cuff Size: Adult)   Pulse 60   Temp 98.5 °F (36.9 °C) (Oral)   Resp 18   Ht 5' 11\" (1.803 m)   Wt 255 lb 2 oz (115.7 kg)   SpO2 96%   BMI 35.58 kg/m²     Recent Travel Screening and Travel History documentation     Travel Screening     Question   Response    In the last month, have you been in contact with someone who was confirmed or suspected to have Coronavirus / COVID-19? No / Unsure    Have you had a COVID-19 viral test in the last 14 days? No    Do you have any of the following new or worsening symptoms? None of these    Have you traveled internationally or domestically in the last month? No      Travel History   Travel since 08/20/21     No documented travel since 08/20/21                   3 most recent [facility] Screens 9/20/2021   Little interest or pleasure in doing things Not at all   Feeling down, depressed, irritable, or hopeless Not at all   Total Score PHQ 2 0            1. Have you been to the ER, urgent care clinic since your last visit? Hospitalized since your last visit? Yes When: [address] Appendix removal Where: [facility]    2. Have you seen or consulted any other health care providers outside of the [address] [place] [address] [place] since your last visit? Include any pap smears or colon screening.  No     Health Maintenance Due   Topic Date Due    COVID-19 Vaccine (1) Never done    Shingrix Vaccine Age 50> (1 of 2) Never done    MICROALBUMIN Q1  10/26/2018    Foot Exam Q1  10/31/2019    A1C test (Diabetic or Prediabetic)  08/01/2020    Lipid Screen  08/01/2020    Flu Vaccine (1) Never done        Learning Assessment 8/1/2019   PRIMARY LEARNER Patient   HIGHEST LEVEL OF EDUCATION - PRIMARY LEARNER  -   BARRIERS PRIMARY LEARNER NONE   PRIMARY LANGUAGE ENGLISH   LEARNER PREFERENCE PRIMARY READING   ANSWERED BY patient   RELATIONSHIP SELF

## 2021-09-20 NOTE — PROGRESS NOTES
HPI:  established patient  Presents for f/u lipids, DM2, HTN, etc    Elevated LFTs  Liver biopsy - mild fibrosis    Known metabolic syndrome as well as etoh use/abuse    Diet and exercise not ideal during this pandemic    No gout flare ups  Off allopurinol    No CP, SOB, neuro sx      Past medical, Social, and Family history reviewed    Prior to Admission medications    Medication Sig Start Date End Date Taking? Authorizing Provider   sotaloL (BETAPACE) 80 mg tablet Take 80 mg by mouth two (2) times a day. 8/30/21  Yes Provider, Historical   allopurinoL (ZYLOPRIM) 100 mg tablet 100 mg. Yes Provider, Historical   furosemide (LASIX) 20 mg tablet TAKE 1 TABLET BY MOUTH EVERY DAY 6/28/21  Yes Yoselyn Vega MD   magnesium chloride (SLOW MAG) 71.5 mg tablet Take 71.5 mg by mouth daily. Yes Provider, Historical   lisinopriL (PRINIVIL, ZESTRIL) 40 mg tablet TAKE 1 TABLET BY MOUTH EVERY DAY 4/2/21  Yes Milagro Neff NP   metFORMIN (GLUCOPHAGE) 1,000 mg tablet TAKE 1 TABLET BY MOUTH TWICE A DAY WITH MEALS 4/1/21  Yes Yoselyn Vega MD   magnesium oxide (MAG-OX) 400 mg tablet Take 1,500 mg by mouth daily. 7/28/20  Yes Provider, Historical   rivaroxaban (XARELTO) 20 mg tab tablet Xarelto 20 mg tablet   Yes Provider, Historical   amLODIPine (NORVASC) 5 mg tablet Take 1 Tab by mouth daily. 2/23/18  Yes Yoselyn Vega MD   rosuvastatin (CRESTOR) 20 mg tablet TAKE 1 TABLET BY MOUTH EVERY DAY AT NIGHT  Patient not taking: Reported on 9/20/2021 10/12/20   Yoselyn Vega MD   colchicine 0.6 mg tablet Take 1 Tab by mouth two (2) times a day. X 1 month then prn  Patient not taking: Reported on 9/20/2021 2/23/18   Yoselyn Vega MD          ROS  Complete ROS reviewed and negative or stable except as noted in HPI. Physical Exam  Vitals and nursing note reviewed. Constitutional:       General: He is not in acute distress. HENT:      Head: Normocephalic and atraumatic.       Mouth/Throat:      Pharynx: No oropharyngeal exudate. Eyes:      General: No scleral icterus. Pupils: Pupils are equal, round, and reactive to light. Neck:      Thyroid: No thyromegaly. Vascular: No JVD. Cardiovascular:      Rate and Rhythm: Normal rate and regular rhythm. Heart sounds: Normal heart sounds. No murmur heard. No friction rub. No gallop. Pulmonary:      Effort: Pulmonary effort is normal. No respiratory distress. Breath sounds: Normal breath sounds. No wheezing or rales. Abdominal:      General: Bowel sounds are normal. There is no distension. Palpations: Abdomen is soft. Tenderness: There is no abdominal tenderness. Musculoskeletal:         General: Normal range of motion. Cervical back: Normal range of motion and neck supple. Lymphadenopathy:      Cervical: No cervical adenopathy. Skin:     General: Skin is warm. Findings: No rash. Neurological:      Mental Status: He is alert and oriented to person, place, and time. Motor: No abnormal muscle tone. Coordination: Coordination normal.           Prior labs reviewed. Assessment/Plan:    ICD-10-CM ICD-9-CM    1. Type 2 diabetes mellitus without complication, without long-term current use of insulin ([facility])  E11.9 250.00 AMB POC HEMOGLOBIN A1C      CBC WITH AUTOMATED DIFF      AMB POC URINE, MICROALBUMIN, SEMIQUANT (3 RESULTS)       DIABETES FOOT EXAM      CBC WITH AUTOMATED DIFF   2. Hyperlipidemia, unspecified hyperlipidemia type  E78.5 272.4 rosuvastatin (CRESTOR) 20 mg tablet      CK      METABOLIC PANEL, COMPREHENSIVE      LIPID PANEL      LIPID PANEL      METABOLIC PANEL, COMPREHENSIVE      CK   3. Gout, unspecified cause, unspecified chronicity, unspecified site  M10.9 274.9 URIC ACID      URIC ACID   4. Essential hypertension  I10 401.9 CBC WITH AUTOMATED DIFF      CBC WITH AUTOMATED DIFF   5. Atrial fibrillation, unspecified type ([address]  I48.91 427.31    6. Hepatic steatosis  K76.0 571.8    7.  Elevated LFTs  R79.89 790.6    8. Alcohol use  Z72.89 V49.89    9. Encounter for immunization  Z23 V03.89 ZOSTER VACC RECOMBINANT ADJUVANTED   10. Vitamin D deficiency  E55.9 268.9 VITAMIN D, 25 HYDROXY      VITAMIN D, 25 HYDROXY   11. Prostate cancer screening  Z12.5 V76.44 PSA SCREENING (SCREENING)      PSA SCREENING (SCREENING)     Follow-up and Dispositions    · Return in about 3 months (around 12/20/2021), or if symptoms worsen or fail to improve, for blood pressure, diabetes, cholesterol. results and schedule of future studies reviewed with patient  reviewed diet, exercise and weight    cardiovascular risk and specific lipid/LDL goals reviewed  reviewed medications and side effects in detail     Pt to work on diet   Resume statin unless GI has an objection - but, should help liver  uloric since cannot use allopurinol due to liver  Shingrix #1  Monitor BP at home. Continue other current medications       An electronic signature was used to authenticate this note.   -- Violet Pedraza MD

## 2021-09-29 RX ORDER — METFORMIN HYDROCHLORIDE 1000 MG/1
TABLET ORAL
Qty: 60 TABLET | Refills: 5 | Status: SHIPPED | OUTPATIENT
Start: 2021-09-29 | End: 2022-04-01

## 2021-12-31 DIAGNOSIS — I10 ESSENTIAL HYPERTENSION: ICD-10-CM

## 2021-12-31 RX ORDER — FUROSEMIDE 20 MG/1
TABLET ORAL
Qty: 30 TABLET | Refills: 5 | Status: SHIPPED | OUTPATIENT
Start: 2021-12-31 | End: 2022-08-05

## 2022-03-18 PROBLEM — Z78.9 ALCOHOL USE: Status: ACTIVE | Noted: 2020-09-08

## 2022-03-18 PROBLEM — Z86.79 S/P ABLATION OF ATRIAL FIBRILLATION: Status: ACTIVE | Noted: 2017-12-22

## 2022-03-18 PROBLEM — Z98.890 S/P ABLATION OF ATRIAL FIBRILLATION: Status: ACTIVE | Noted: 2017-12-22

## 2022-03-19 PROBLEM — E66.01 SEVERE OBESITY (BMI 35.0-39.9) WITH COMORBIDITY (HCC): Status: ACTIVE | Noted: 2018-04-26

## 2022-04-01 RX ORDER — METFORMIN HYDROCHLORIDE 1000 MG/1
TABLET ORAL
Qty: 60 TABLET | Refills: 5 | Status: SHIPPED | OUTPATIENT
Start: 2022-04-01 | End: 2022-09-30 | Stop reason: SDUPTHER

## 2022-07-08 DIAGNOSIS — I10 ESSENTIAL HYPERTENSION: ICD-10-CM

## 2022-07-27 ENCOUNTER — TELEPHONE (OUTPATIENT)
Dept: INTERNAL MEDICINE CLINIC | Age: 57
End: 2022-07-27

## 2022-07-27 NOTE — TELEPHONE ENCOUNTER
----- Message from Sunita Avitia sent at 7/19/2022 11:47 AM EDT -----  Subject: Message to Provider    QUESTIONS  Information for Provider? PT needs to reschedule his physical and needs to   be seen for his second shingles vaccine asap. Two appts have been   cancelled. May need to restart shingles vaccine.   ---------------------------------------------------------------------------  --------------  Santiago FERREIRA  [phone]; OK to leave message on voicemail  ---------------------------------------------------------------------------  --------------  SCRIPT ANSWERS  Relationship to Patient?  Self

## 2022-08-05 RX ORDER — FUROSEMIDE 20 MG/1
TABLET ORAL
Qty: 30 TABLET | Refills: 5 | Status: SHIPPED | OUTPATIENT
Start: 2022-08-05 | End: 2022-08-08 | Stop reason: SDUPTHER

## 2022-08-08 DIAGNOSIS — I10 ESSENTIAL HYPERTENSION: ICD-10-CM

## 2022-08-08 RX ORDER — FUROSEMIDE 20 MG/1
20 TABLET ORAL DAILY
Qty: 30 TABLET | Refills: 5 | Status: SHIPPED | OUTPATIENT
Start: 2022-08-08

## 2022-09-30 RX ORDER — METFORMIN HYDROCHLORIDE 1000 MG/1
1000 TABLET ORAL 2 TIMES DAILY WITH MEALS
Qty: 180 TABLET | Refills: 0 | Status: SHIPPED | OUTPATIENT
Start: 2022-09-30

## 2022-09-30 NOTE — TELEPHONE ENCOUNTER
Last visit 09/20/2021 MD Verena Kilgore   Next appointment 11/14/2022 MD Schneider  Previous refill encounter(s)   04/01/2022 Glucophage #60 with 5 refills. For Pharmacy Admin Tracking Only  Intervention Detail: New Rx: 1, reason: Patient Preference  Time Spent (min): 5      Requested Prescriptions     Pending Prescriptions Disp Refills    metFORMIN (GLUCOPHAGE) 1,000 mg tablet 180 Tablet 0     Sig: Take 1 Tablet by mouth two (2) times daily (with meals).

## 2023-05-26 RX ORDER — COLCHICINE 0.6 MG/1
0.6 TABLET ORAL 2 TIMES DAILY
COMMUNITY
Start: 2018-02-23

## 2023-05-26 RX ORDER — ROSUVASTATIN CALCIUM 20 MG/1
1 TABLET, COATED ORAL NIGHTLY
COMMUNITY
Start: 2021-09-20

## 2023-05-26 RX ORDER — AMLODIPINE BESYLATE 5 MG/1
5 TABLET ORAL DAILY
COMMUNITY
Start: 2018-02-23

## 2023-05-26 RX ORDER — LISINOPRIL 40 MG/1
1 TABLET ORAL DAILY
COMMUNITY
Start: 2021-04-02

## 2023-05-26 RX ORDER — MAGNESIUM OXIDE 400 MG/1
1500 TABLET ORAL DAILY
COMMUNITY
Start: 2020-07-28

## 2023-05-26 RX ORDER — SOTALOL HYDROCHLORIDE 80 MG/1
80 TABLET ORAL 2 TIMES DAILY
COMMUNITY
Start: 2021-08-30

## 2023-05-26 RX ORDER — FEBUXOSTAT 80 MG/1
80 TABLET, FILM COATED ORAL DAILY
COMMUNITY
Start: 2021-09-20

## 2023-05-26 RX ORDER — FUROSEMIDE 20 MG/1
20 TABLET ORAL DAILY
COMMUNITY
Start: 2022-08-08